# Patient Record
Sex: FEMALE | Race: OTHER | Employment: FULL TIME | URBAN - METROPOLITAN AREA
[De-identification: names, ages, dates, MRNs, and addresses within clinical notes are randomized per-mention and may not be internally consistent; named-entity substitution may affect disease eponyms.]

---

## 2017-02-13 ENCOUNTER — OFFICE VISIT (OUTPATIENT)
Dept: OTOLARYNGOLOGY | Facility: CLINIC | Age: 32
End: 2017-02-13

## 2017-02-13 ENCOUNTER — APPOINTMENT (OUTPATIENT)
Dept: LAB | Facility: HOSPITAL | Age: 32
End: 2017-02-13
Attending: OTOLARYNGOLOGY
Payer: COMMERCIAL

## 2017-02-13 VITALS
SYSTOLIC BLOOD PRESSURE: 108 MMHG | BODY MASS INDEX: 20.09 KG/M2 | WEIGHT: 125 LBS | DIASTOLIC BLOOD PRESSURE: 74 MMHG | HEART RATE: 76 BPM | TEMPERATURE: 99 F | HEIGHT: 66 IN

## 2017-02-13 DIAGNOSIS — Z91.09 ENVIRONMENTAL ALLERGIES: ICD-10-CM

## 2017-02-13 DIAGNOSIS — E04.1 THYROID NODULE: Primary | ICD-10-CM

## 2017-02-13 DIAGNOSIS — E04.1 THYROID NODULE: ICD-10-CM

## 2017-02-13 LAB — TSH SERPL-ACNC: 12.1 UIU/ML (ref 0.34–5.6)

## 2017-02-13 PROCEDURE — 99212 OFFICE O/P EST SF 10 MIN: CPT | Performed by: OTOLARYNGOLOGY

## 2017-02-13 PROCEDURE — 84443 ASSAY THYROID STIM HORMONE: CPT

## 2017-02-13 PROCEDURE — 36415 COLL VENOUS BLD VENIPUNCTURE: CPT

## 2017-02-13 PROCEDURE — 99213 OFFICE O/P EST LOW 20 MIN: CPT | Performed by: OTOLARYNGOLOGY

## 2017-02-13 RX ORDER — MONTELUKAST SODIUM 10 MG/1
10 TABLET ORAL NIGHTLY
Qty: 30 TABLET | Refills: 11 | Status: SHIPPED | OUTPATIENT
Start: 2017-02-13 | End: 2019-06-17

## 2017-02-13 NOTE — PROGRESS NOTES
Hayden Bourgeois is a 33year old female. Patient presents with: Follow - Up: thyroid,due for labs      HISTORY OF PRESENT ILLNESS    4/25/16 Patient presents for evaluation of a thyroid nodule. This was noticed 3years ago by the patient.  This has rolle Family History   Problem Relation Age of Onset   • Other[other] [OTHER] Mother        Past Medical History   Diagnosis Date   • Cancer [facility]) 2001     Osteosarcoma - left tibia   • Allergic rhinitis            Past Surgical History    BACK SONNY External nose - Normal. Lips/teeth/gums - Normal. Tonsils - Normal. Oropharynx - Normal.   Nose/Mouth/Throat Normal Nares - Right: Normal Left: Normal. Septum -Normal  Turbinates - Right: Normal, Left: Normal.          Current outpatient prescriptions:   •

## 2017-02-27 ENCOUNTER — PATIENT MESSAGE (OUTPATIENT)
Dept: OTOLARYNGOLOGY | Facility: CLINIC | Age: 32
End: 2017-02-27

## 2017-02-28 ENCOUNTER — TELEPHONE (OUTPATIENT)
Dept: OTOLARYNGOLOGY | Facility: CLINIC | Age: 32
End: 2017-02-28

## 2017-03-07 ENCOUNTER — OFFICE VISIT (OUTPATIENT)
Dept: ENDOCRINOLOGY CLINIC | Facility: CLINIC | Age: 32
End: 2017-03-07

## 2017-03-07 ENCOUNTER — APPOINTMENT (OUTPATIENT)
Dept: LAB | Facility: HOSPITAL | Age: 32
End: 2017-03-07
Attending: INTERNAL MEDICINE
Payer: COMMERCIAL

## 2017-03-07 VITALS
HEIGHT: 66 IN | BODY MASS INDEX: 20.73 KG/M2 | SYSTOLIC BLOOD PRESSURE: 122 MMHG | DIASTOLIC BLOOD PRESSURE: 77 MMHG | TEMPERATURE: 98 F | RESPIRATION RATE: 18 BRPM | HEART RATE: 55 BPM | WEIGHT: 129 LBS

## 2017-03-07 DIAGNOSIS — E03.9 HYPOTHYROIDISM, UNSPECIFIED TYPE: ICD-10-CM

## 2017-03-07 DIAGNOSIS — E03.9 HYPOTHYROIDISM, UNSPECIFIED TYPE: Primary | ICD-10-CM

## 2017-03-07 LAB
T4 FREE SERPL-MCNC: 0.53 NG/DL (ref 0.58–1.64)
TSH SERPL-ACNC: 14.71 UIU/ML (ref 0.34–5.6)

## 2017-03-07 PROCEDURE — 99213 OFFICE O/P EST LOW 20 MIN: CPT | Performed by: INTERNAL MEDICINE

## 2017-03-07 PROCEDURE — 84439 ASSAY OF FREE THYROXINE: CPT

## 2017-03-07 PROCEDURE — 99212 OFFICE O/P EST SF 10 MIN: CPT | Performed by: INTERNAL MEDICINE

## 2017-03-07 PROCEDURE — 84443 ASSAY THYROID STIM HORMONE: CPT

## 2017-03-07 PROCEDURE — 36415 COLL VENOUS BLD VENIPUNCTURE: CPT

## 2017-03-07 NOTE — PROGRESS NOTES
Return Office Visit     CHIEF COMPLAINT:    Post operative hypothyroidism     HISTORY OF PRESENT ILLNESS:  Rosangela Moseley is a 33year old female who presents for follow of post operative hypothyroidism.      She underwent a right hemithyroidectomy for c normal affect  EYES:  No proptosis, no ptosis, conjunctiva normal  ENT:  Normocephalic, atraumatic  NECK:  Supple, symmetrical, trachea midline, no adenopathy, Left thyroid normal in size, non tender.   HEMATOLOGIC/LYMPHATICS:  no cervical lymphadenopathy a

## 2017-03-08 DIAGNOSIS — E89.0 POSTOPERATIVE HYPOTHYROIDISM: Primary | ICD-10-CM

## 2017-03-08 NOTE — TELEPHONE ENCOUNTER
Please let the patient know that I reviewed her thyroid labs   I recommend starting 88 mcg daily. She should repeat labs ( ordered TSH and FT 4) in 6 weeks after starting medication. Ordered.   Please finalize medication after checking 2 month vs 3 month

## 2017-03-09 NOTE — TELEPHONE ENCOUNTER
From: Oralia Mejia  To: Kimberly Evans MD  Sent: 2/27/2017 11:14 AM CST  Subject: Prescription Question    Due to my test results, should I get a prescription? What is the next step? Can I get an answer today?

## 2017-03-10 RX ORDER — LEVOTHYROXINE SODIUM 88 UG/1
88 TABLET ORAL DAILY
Qty: 90 TABLET | Refills: 0 | Status: SHIPPED | OUTPATIENT
Start: 2017-03-10 | End: 2017-05-31 | Stop reason: DRUGHIGH

## 2017-03-10 NOTE — TELEPHONE ENCOUNTER
Lili returned call this morning. Discussed results and she understands to start LT4 88mcg PO daily. Discussed importance of taking on an empty stomach at least 1 hr before food and  any vitamins by 4 hours.  She will repeat labs in 6 weeks afte

## 2017-05-22 ENCOUNTER — PATIENT MESSAGE (OUTPATIENT)
Dept: ENDOCRINOLOGY CLINIC | Facility: CLINIC | Age: 32
End: 2017-05-22

## 2017-05-22 NOTE — TELEPHONE ENCOUNTER
From: Jl Whiteside  To: John Devi MD  Sent: 5/22/2017 2:10 PM CDT  Subject: Other    I received an email today about an analysis that I need to get done. I am not sure which analysis that letter refer to.  I will be done with the Levothyroxine

## 2017-05-22 NOTE — TELEPHONE ENCOUNTER
[facility] Sent [facility] an [facility]. She was due for thyroid labs on 4/19/17.

## 2017-05-30 ENCOUNTER — APPOINTMENT (OUTPATIENT)
Dept: LAB | Age: 32
End: 2017-05-30
Attending: INTERNAL MEDICINE
Payer: COMMERCIAL

## 2017-05-30 DIAGNOSIS — E89.0 POSTOPERATIVE HYPOTHYROIDISM: ICD-10-CM

## 2017-05-30 PROCEDURE — 84443 ASSAY THYROID STIM HORMONE: CPT

## 2017-05-30 PROCEDURE — 36415 COLL VENOUS BLD VENIPUNCTURE: CPT

## 2017-05-30 PROCEDURE — 84439 ASSAY OF FREE THYROXINE: CPT

## 2017-05-31 ENCOUNTER — TELEPHONE (OUTPATIENT)
Dept: ENDOCRINOLOGY CLINIC | Facility: CLINIC | Age: 32
End: 2017-05-31

## 2017-05-31 DIAGNOSIS — E03.9 HYPOTHYROIDISM, UNSPECIFIED TYPE: Primary | ICD-10-CM

## 2017-05-31 RX ORDER — LEVOTHYROXINE SODIUM 0.07 MG/1
75 TABLET ORAL DAILY
Qty: 90 TABLET | Refills: 0 | Status: SHIPPED | OUTPATIENT
Start: 2017-05-31 | End: 2017-08-15

## 2017-05-31 NOTE — TELEPHONE ENCOUNTER
She is on LT 4 88 mcg daily. Labs indicate need for dose reduction. Decrease to 75 mcg daily  Repeat TSH and FT4 in 8 weeks after dose change. Medication and labs ordered.

## 2017-05-31 NOTE — TELEPHONE ENCOUNTER
Spoke with Lili and let her know per AM she needs dose decrease. Understands to decrease to 75mcg PO daily. She will repeat labs in 8 weeks after dose change.

## 2017-07-24 ENCOUNTER — APPOINTMENT (OUTPATIENT)
Dept: LAB | Age: 32
End: 2017-07-24
Attending: INTERNAL MEDICINE
Payer: COMMERCIAL

## 2017-07-24 DIAGNOSIS — E03.9 HYPOTHYROIDISM, UNSPECIFIED TYPE: ICD-10-CM

## 2017-07-24 LAB
T4 FREE SERPL-MCNC: 1.01 NG/DL (ref 0.58–1.64)
TSH SERPL-ACNC: 2.14 UIU/ML (ref 0.45–5.33)

## 2017-07-24 PROCEDURE — 84443 ASSAY THYROID STIM HORMONE: CPT

## 2017-07-24 PROCEDURE — 36415 COLL VENOUS BLD VENIPUNCTURE: CPT

## 2017-07-24 PROCEDURE — 84439 ASSAY OF FREE THYROXINE: CPT

## 2017-08-15 RX ORDER — LEVOTHYROXINE SODIUM 0.07 MG/1
75 TABLET ORAL DAILY
Qty: 90 TABLET | Refills: 0 | Status: SHIPPED
Start: 2017-08-15 | End: 2017-12-04

## 2017-08-15 NOTE — TELEPHONE ENCOUNTER
From: Lamin Tobin  Sent: 8/15/2017 12:57 PM CDT  Subject: Medication Renewal Request    Lamin Tobin would like a refill of the following medications:  Levothyroxine Sodium 75 MCG Oral Tab Keagan Montanez MD]    [facility]

## 2017-11-06 RX ORDER — LEVOTHYROXINE SODIUM 0.07 MG/1
TABLET ORAL
Qty: 30 TABLET | Refills: 0 | OUTPATIENT
Start: 2017-11-06

## 2017-11-06 NOTE — TELEPHONE ENCOUNTER
LOV 3/7/17 with RTC 6 months. No F/U scheduled Called patient with a . Left detailed message that she is due for an appt. 1 month refill pending.

## 2017-11-16 RX ORDER — LEVOTHYROXINE SODIUM 0.07 MG/1
TABLET ORAL
Qty: 30 TABLET | Refills: 0 | OUTPATIENT
Start: 2017-11-16

## 2017-11-21 RX ORDER — LEVOTHYROXINE SODIUM 0.07 MG/1
75 TABLET ORAL
Qty: 30 TABLET | Refills: 0 | Status: CANCELLED | OUTPATIENT
Start: 2017-11-21

## 2017-11-21 NOTE — TELEPHONE ENCOUNTER
LOV 3/7/17. She can schedule an appointment with you on EARTHNET by clicking on the \"make an appointment\" option. Refill pending. Her referral for endocrine is still active for one more visit. She will need a new referral after that.

## 2017-12-04 ENCOUNTER — OFFICE VISIT (OUTPATIENT)
Dept: FAMILY MEDICINE CLINIC | Facility: CLINIC | Age: 32
End: 2017-12-04

## 2017-12-04 ENCOUNTER — LAB ENCOUNTER (OUTPATIENT)
Dept: LAB | Age: 32
End: 2017-12-04
Attending: FAMILY MEDICINE
Payer: COMMERCIAL

## 2017-12-04 VITALS
TEMPERATURE: 98 F | HEART RATE: 55 BPM | BODY MASS INDEX: 21.56 KG/M2 | DIASTOLIC BLOOD PRESSURE: 82 MMHG | HEIGHT: 65.5 IN | WEIGHT: 131 LBS | SYSTOLIC BLOOD PRESSURE: 135 MMHG

## 2017-12-04 DIAGNOSIS — E89.0 POSTOPERATIVE HYPOTHYROIDISM: ICD-10-CM

## 2017-12-04 DIAGNOSIS — Z00.00 ROUTINE GENERAL MEDICAL EXAMINATION AT A HEALTH CARE FACILITY: ICD-10-CM

## 2017-12-04 DIAGNOSIS — Z00.00 ROUTINE GENERAL MEDICAL EXAMINATION AT A HEALTH CARE FACILITY: Primary | ICD-10-CM

## 2017-12-04 PROCEDURE — 80061 LIPID PANEL: CPT

## 2017-12-04 PROCEDURE — 84439 ASSAY OF FREE THYROXINE: CPT

## 2017-12-04 PROCEDURE — 80053 COMPREHEN METABOLIC PANEL: CPT

## 2017-12-04 PROCEDURE — 99395 PREV VISIT EST AGE 18-39: CPT | Performed by: FAMILY MEDICINE

## 2017-12-04 PROCEDURE — 84443 ASSAY THYROID STIM HORMONE: CPT

## 2017-12-04 PROCEDURE — 85025 COMPLETE CBC W/AUTO DIFF WBC: CPT

## 2017-12-04 PROCEDURE — 36415 COLL VENOUS BLD VENIPUNCTURE: CPT

## 2017-12-04 NOTE — PROGRESS NOTES
HPI:    Patient ID: Neil Rosario is a 27year old female.     HPI  Patient presents with:  Routine Physical  Thyroid Problem: pt would like to refill on Levothyroxine    Past Medical History:   Diagnosis Date   • Allergic rhinitis    [facility]   Pulmonary/Chest: Effort normal and breath sounds normal.   Abdominal: There is no splenomegaly or hepatomegaly. There is no tenderness. There is no CVA tenderness. Musculoskeletal:        Left knee: She exhibits decreased range of motion.

## 2017-12-04 NOTE — TELEPHONE ENCOUNTER
From: Kimberly Meza  Sent: 12/4/2017 3:23 PM CST  Subject: Medication Renewal Request    Lili Chou would like a refill of the following medications:     Levothyroxine Sodium 75 MCG Oral Tab Marian Ayala MD]    Preferred pharmacy: [facility] [address] [place] [address], [phone], [phone]

## 2017-12-04 NOTE — TELEPHONE ENCOUNTER
LOV 3/2017. RTC in 6 months. Replied to patients request through mychart informing her she is due for follow up.

## 2017-12-11 RX ORDER — LEVOTHYROXINE SODIUM 0.07 MG/1
75 TABLET ORAL DAILY
Qty: 30 TABLET | Refills: 0 | Status: SHIPPED
Start: 2017-12-11 | End: 2017-12-12

## 2017-12-12 ENCOUNTER — OFFICE VISIT (OUTPATIENT)
Dept: ENDOCRINOLOGY CLINIC | Facility: CLINIC | Age: 32
End: 2017-12-12

## 2017-12-12 VITALS
HEIGHT: 66 IN | DIASTOLIC BLOOD PRESSURE: 78 MMHG | BODY MASS INDEX: 21.16 KG/M2 | SYSTOLIC BLOOD PRESSURE: 130 MMHG | WEIGHT: 131.63 LBS | HEART RATE: 57 BPM

## 2017-12-12 DIAGNOSIS — E03.9 HYPOTHYROIDISM, UNSPECIFIED TYPE: Primary | ICD-10-CM

## 2017-12-12 PROBLEM — E03.8 OTHER SPECIFIED HYPOTHYROIDISM: Status: ACTIVE | Noted: 2017-12-12

## 2017-12-12 PROCEDURE — 99212 OFFICE O/P EST SF 10 MIN: CPT | Performed by: INTERNAL MEDICINE

## 2017-12-12 PROCEDURE — 99213 OFFICE O/P EST LOW 20 MIN: CPT | Performed by: INTERNAL MEDICINE

## 2017-12-12 RX ORDER — LEVOTHYROXINE SODIUM 88 UG/1
88 TABLET ORAL
Qty: 90 TABLET | Refills: 0 | Status: SHIPPED | OUTPATIENT
Start: 2017-12-12 | End: 2018-05-25

## 2017-12-12 RX ORDER — ERGOCALCIFEROL (VITAMIN D2) 10 MCG
TABLET ORAL
COMMUNITY
End: 2018-11-14

## 2017-12-12 NOTE — PROGRESS NOTES
Return Office Visit     CHIEF COMPLAINT:    Post operative hypothyroidism     HISTORY OF PRESENT ILLNESS:  Salina Del Valle is a 27year old female who presents for follow of post operative hypothyroidism.      She underwent a right hemithyroidectomy for c for: rash, blister, cellulitis,       PHYSICAL EXAM:    12/12/17  1742 12/12/17  1821   BP: 152/94 130/78   Pulse: 57    Weight: 131 lb 9.6 oz (59.7 kg)    Height: 5' 6\" (1.676 m)      BMI: Body mass index is 21.24 kg/m².      CONSTITUTIONAL:  awake, alert noncompliance including the development of myxedema coma and death discussed  Patient knows that she should not get pregnant before TSH is < 2.5. Patient verbalized understanding of above instructions. RTC in 6 months.       Orders Placed This Encoun

## 2018-04-05 ENCOUNTER — PATIENT MESSAGE (OUTPATIENT)
Dept: ENDOCRINOLOGY CLINIC | Facility: CLINIC | Age: 33
End: 2018-04-05

## 2018-04-05 NOTE — TELEPHONE ENCOUNTER
LOV 12/12/17 and patient was instructed to RTC in 6 months. Would you like to see her sooner? Otherwise can let her know that there are thyroid lab orders pending in her chart.

## 2018-04-05 NOTE — TELEPHONE ENCOUNTER
From: Ann Nassar  To: Sia Garvin MD  Sent: 4/5/2018 1:03 PM CDT  Subject: Visit Verl Opitz Dr Rama Standard,    I just wanted to confirm that the blood analysis request is already in the system.  I wanted to make sure I can go and

## 2018-05-25 RX ORDER — LEVOTHYROXINE SODIUM 88 UG/1
88 TABLET ORAL
Qty: 90 TABLET | Refills: 0 | Status: SHIPPED | OUTPATIENT
Start: 2018-05-25 | End: 2018-08-20

## 2018-05-31 ENCOUNTER — OFFICE VISIT (OUTPATIENT)
Dept: FAMILY MEDICINE CLINIC | Facility: CLINIC | Age: 33
End: 2018-05-31

## 2018-05-31 VITALS
WEIGHT: 132.5 LBS | HEART RATE: 62 BPM | BODY MASS INDEX: 21 KG/M2 | DIASTOLIC BLOOD PRESSURE: 87 MMHG | SYSTOLIC BLOOD PRESSURE: 124 MMHG

## 2018-05-31 DIAGNOSIS — Z85.830 HISTORY OF OSTEOSARCOMA: ICD-10-CM

## 2018-05-31 DIAGNOSIS — R82.90 BAD ODOR OF URINE: Primary | ICD-10-CM

## 2018-05-31 DIAGNOSIS — R31.9 HEMATURIA, UNSPECIFIED TYPE: ICD-10-CM

## 2018-05-31 DIAGNOSIS — M62.562 ATROPHY OF MUSCLE OF LEFT LOWER LEG: ICD-10-CM

## 2018-05-31 PROCEDURE — 81002 URINALYSIS NONAUTO W/O SCOPE: CPT | Performed by: FAMILY MEDICINE

## 2018-05-31 PROCEDURE — 99212 OFFICE O/P EST SF 10 MIN: CPT | Performed by: FAMILY MEDICINE

## 2018-05-31 PROCEDURE — 99214 OFFICE O/P EST MOD 30 MIN: CPT | Performed by: FAMILY MEDICINE

## 2018-06-02 RX ORDER — CIPROFLOXACIN 500 MG/1
500 TABLET, FILM COATED ORAL 2 TIMES DAILY
Qty: 6 TABLET | Refills: 0 | Status: SHIPPED | OUTPATIENT
Start: 2018-06-02 | End: 2018-11-14

## 2018-08-20 ENCOUNTER — TELEPHONE (OUTPATIENT)
Dept: ENDOCRINOLOGY CLINIC | Facility: CLINIC | Age: 33
End: 2018-08-20

## 2018-08-20 RX ORDER — LEVOTHYROXINE SODIUM 88 UG/1
TABLET ORAL
Qty: 90 TABLET | Refills: 0 | Status: SHIPPED | OUTPATIENT
Start: 2018-08-20 | End: 2018-12-16

## 2018-08-20 NOTE — TELEPHONE ENCOUNTER
Refilled  Left VM that she is due for a follow up  Please send a letter also    Will not be able to refill after this without an apt since it will be almost a year. Thanks.

## 2018-11-06 ENCOUNTER — PATIENT MESSAGE (OUTPATIENT)
Dept: FAMILY MEDICINE CLINIC | Facility: CLINIC | Age: 33
End: 2018-11-06

## 2018-11-06 ENCOUNTER — APPOINTMENT (OUTPATIENT)
Dept: LAB | Age: 33
End: 2018-11-06
Attending: INTERNAL MEDICINE
Payer: COMMERCIAL

## 2018-11-06 ENCOUNTER — TELEPHONE (OUTPATIENT)
Dept: ENDOCRINOLOGY CLINIC | Facility: CLINIC | Age: 33
End: 2018-11-06

## 2018-11-06 DIAGNOSIS — N91.2 AMENORRHEA: Primary | ICD-10-CM

## 2018-11-06 DIAGNOSIS — E03.9 HYPOTHYROIDISM, UNSPECIFIED TYPE: ICD-10-CM

## 2018-11-06 PROCEDURE — 84439 ASSAY OF FREE THYROXINE: CPT

## 2018-11-06 PROCEDURE — 84443 ASSAY THYROID STIM HORMONE: CPT

## 2018-11-06 PROCEDURE — 36415 COLL VENOUS BLD VENIPUNCTURE: CPT

## 2018-11-06 NOTE — TELEPHONE ENCOUNTER
From: Oralia Mejia  To:  Chacha Arias DO  Sent: 11/6/2018 9:48 AM CST  Subject: Test Results Question    Can I get a prescription for a blood pregnancy test?

## 2018-11-06 NOTE — TELEPHONE ENCOUNTER
Spoke with patient. Labs are still in process. Advised patient to keep scheduled appt with AM tomorrow and she can review lab results at that time. She is agreeable.

## 2018-11-06 NOTE — TELEPHONE ENCOUNTER
Pt would like to know the results from labs taken pt is scheduled for tomorrow wanted to know if she can be seen today at 1015 am attempt to transfer to ask w no answer pt ok w that is there a way she can be seen later today rather then tomorrow

## 2018-11-07 ENCOUNTER — OFFICE VISIT (OUTPATIENT)
Dept: ENDOCRINOLOGY CLINIC | Facility: CLINIC | Age: 33
End: 2018-11-07

## 2018-11-07 ENCOUNTER — APPOINTMENT (OUTPATIENT)
Dept: LAB | Facility: HOSPITAL | Age: 33
End: 2018-11-07
Attending: FAMILY MEDICINE
Payer: COMMERCIAL

## 2018-11-07 VITALS
DIASTOLIC BLOOD PRESSURE: 82 MMHG | SYSTOLIC BLOOD PRESSURE: 138 MMHG | HEART RATE: 83 BPM | BODY MASS INDEX: 22 KG/M2 | WEIGHT: 135.38 LBS

## 2018-11-07 DIAGNOSIS — O99.281 HYPOTHYROIDISM DURING PREGNANCY IN FIRST TRIMESTER: Primary | ICD-10-CM

## 2018-11-07 DIAGNOSIS — N91.2 AMENORRHEA: ICD-10-CM

## 2018-11-07 DIAGNOSIS — E03.9 HYPOTHYROIDISM DURING PREGNANCY IN FIRST TRIMESTER: Primary | ICD-10-CM

## 2018-11-07 PROCEDURE — 84703 CHORIONIC GONADOTROPIN ASSAY: CPT

## 2018-11-07 PROCEDURE — 36415 COLL VENOUS BLD VENIPUNCTURE: CPT

## 2018-11-07 PROCEDURE — 99212 OFFICE O/P EST SF 10 MIN: CPT | Performed by: INTERNAL MEDICINE

## 2018-11-07 PROCEDURE — 99213 OFFICE O/P EST LOW 20 MIN: CPT | Performed by: INTERNAL MEDICINE

## 2018-11-07 NOTE — PROGRESS NOTES
Return Office Visit     CHIEF COMPLAINT:    Post operative hypothyroidism     HISTORY OF PRESENT ILLNESS:  Corrie Slaughter is a 26year old female who presents for follow of post operative hypothyroidism.      She underwent a right hemithyroidectomy for c Negative for: bruising, lower extremity edema  Endocrine: Negative for: polyuria, polydypsia  Skin: Negative for: rash, blister, cellulitis,       PHYSICAL EXAM:    11/07/18  0950   BP: 138/82   Pulse: 83   Weight: 135 lb 6.4 oz (61.4 kg)     BMI: Body mas can occur in pregnant women with overt hypothyroidism. Also, the offspring of these mothers can have complications such as premature birth, low birth weight and increased  respiratory distress.      She is on a MV      Patient states she has a sore

## 2018-11-09 ENCOUNTER — PATIENT MESSAGE (OUTPATIENT)
Dept: FAMILY MEDICINE CLINIC | Facility: CLINIC | Age: 33
End: 2018-11-09

## 2018-11-13 NOTE — TELEPHONE ENCOUNTER
From: Gary Garvin  To: Jax Miller DO  Sent: 11/9/2018 9:25 AM CST  Subject: Test Results Kendrick Grande,    I have not received the results for the test yet and it has been 48 hours already.  I was wondering if you can help me ge

## 2018-11-14 ENCOUNTER — APPOINTMENT (OUTPATIENT)
Dept: LAB | Facility: HOSPITAL | Age: 33
End: 2018-11-14
Attending: ADVANCED PRACTICE MIDWIFE
Payer: COMMERCIAL

## 2018-11-14 ENCOUNTER — OFFICE VISIT (OUTPATIENT)
Dept: OBGYN CLINIC | Facility: CLINIC | Age: 33
End: 2018-11-14

## 2018-11-14 VITALS
DIASTOLIC BLOOD PRESSURE: 87 MMHG | HEART RATE: 75 BPM | SYSTOLIC BLOOD PRESSURE: 132 MMHG | WEIGHT: 132 LBS | BODY MASS INDEX: 21 KG/M2

## 2018-11-14 DIAGNOSIS — O99.280 HYPOTHYROID IN PREGNANCY, ANTEPARTUM: ICD-10-CM

## 2018-11-14 DIAGNOSIS — N92.6 MISSED MENSES: Primary | ICD-10-CM

## 2018-11-14 DIAGNOSIS — Z85.830 PERSONAL HISTORY OF OSTEOSARCOMA: ICD-10-CM

## 2018-11-14 DIAGNOSIS — E03.9 HYPOTHYROID IN PREGNANCY, ANTEPARTUM: ICD-10-CM

## 2018-11-14 DIAGNOSIS — N92.6 MISSED MENSES: ICD-10-CM

## 2018-11-14 DIAGNOSIS — O09.511 PRIMIGRAVIDA OF ADVANCED MATERNAL AGE IN FIRST TRIMESTER: ICD-10-CM

## 2018-11-14 PROBLEM — O09.521 MULTIGRAVIDA OF ADVANCED MATERNAL AGE IN FIRST TRIMESTER (HCC): Status: ACTIVE | Noted: 2018-11-14

## 2018-11-14 PROBLEM — O09.519 ADVANCED MATERNAL AGE, 1ST PREGNANCY (HCC): Status: ACTIVE | Noted: 2018-11-14

## 2018-11-14 PROBLEM — O09.521 MULTIGRAVIDA OF ADVANCED MATERNAL AGE IN FIRST TRIMESTER: Status: ACTIVE | Noted: 2018-11-14

## 2018-11-14 PROBLEM — O09.519 ADVANCED MATERNAL AGE, 1ST PREGNANCY: Status: ACTIVE | Noted: 2018-11-14

## 2018-11-14 PROCEDURE — 36415 COLL VENOUS BLD VENIPUNCTURE: CPT

## 2018-11-14 PROCEDURE — 84144 ASSAY OF PROGESTERONE: CPT

## 2018-11-14 PROCEDURE — 99202 OFFICE O/P NEW SF 15 MIN: CPT | Performed by: ADVANCED PRACTICE MIDWIFE

## 2018-11-14 PROCEDURE — 84702 CHORIONIC GONADOTROPIN TEST: CPT

## 2018-11-14 PROCEDURE — 81025 URINE PREGNANCY TEST: CPT | Performed by: ADVANCED PRACTICE MIDWIFE

## 2018-11-14 NOTE — PROGRESS NOTES
HPI:    Patient ID: Maia Meyer is a 26year old female. LMP 10/10/18. Some mild cramping but no bleeding. Denies nausea and vomiting. MIld breast tenderness. JAIME 7/17 19 GA 5 weeks.     H/O Scoliosis with surgical correction with a jose cruz  H/O os Diagnosis Date   • Allergic rhinitis    • Cancer [facility]) 2001    Osteosarcoma - left tibia   • Hypothyroidism    • Scoliosis    • Scoliosis, adolescent, acquired 12/04/2017      Past Surgical History:   Procedure Laterality Date   • BACK SURGERY      escol hot, dogs, making sure food is appropriately cooked and washed to avoid food-borne illnesses. 3.  Travel discussed, avoid travel to zika zones, use of support stockings with flights longer than 3 hours, movement every 2-3 hours if driving  4.   Discussed

## 2018-11-28 ENCOUNTER — NURSE ONLY (OUTPATIENT)
Dept: OBGYN CLINIC | Facility: CLINIC | Age: 33
End: 2018-11-28

## 2018-11-28 VITALS — BODY MASS INDEX: 22.76 KG/M2 | WEIGHT: 136.63 LBS | HEIGHT: 65 IN

## 2018-11-28 DIAGNOSIS — Z34.90 PREGNANCY, UNSPECIFIED GESTATIONAL AGE: Primary | ICD-10-CM

## 2018-11-28 DIAGNOSIS — O09.521 MULTIGRAVIDA OF ADVANCED MATERNAL AGE IN FIRST TRIMESTER: ICD-10-CM

## 2018-11-28 DIAGNOSIS — Z85.830 HISTORY OF OSTEOSARCOMA: ICD-10-CM

## 2018-11-28 DIAGNOSIS — E03.8 OTHER SPECIFIED HYPOTHYROIDISM: ICD-10-CM

## 2018-11-28 PROCEDURE — 90471 IMMUNIZATION ADMIN: CPT | Performed by: ADVANCED PRACTICE MIDWIFE

## 2018-11-28 PROCEDURE — 90686 IIV4 VACC NO PRSV 0.5 ML IM: CPT | Performed by: ADVANCED PRACTICE MIDWIFE

## 2018-11-28 NOTE — PROGRESS NOTES
NOB education complete & information given to pt.  Isha Chowdary at visit. Referred by Marlyn Nava CNM. Pt hx hypothyroid w/ lobectomy, osteosarcoma 2011 & scoliosis requiring surgery. Pt traveled to [facility] 9/24-9/28/18.  Did not sustain any mosquito bi

## 2018-12-01 ENCOUNTER — HOSPITAL ENCOUNTER (OUTPATIENT)
Dept: ULTRASOUND IMAGING | Age: 33
Discharge: HOME | End: 2018-12-01
Attending: ADVANCED PRACTICE MIDWIFE
Payer: COMMERCIAL

## 2018-12-01 DIAGNOSIS — N92.6 MISSED MENSES: ICD-10-CM

## 2018-12-01 PROCEDURE — 76817 TRANSVAGINAL US OBSTETRIC: CPT | Performed by: ADVANCED PRACTICE MIDWIFE

## 2018-12-01 PROCEDURE — 76801 OB US < 14 WKS SINGLE FETUS: CPT | Performed by: ADVANCED PRACTICE MIDWIFE

## 2018-12-03 ENCOUNTER — TELEPHONE (OUTPATIENT)
Dept: OBGYN CLINIC | Facility: CLINIC | Age: 33
End: 2018-12-03

## 2018-12-03 DIAGNOSIS — O20.0 THREATENED ABORTION, ANTEPARTUM: Primary | ICD-10-CM

## 2018-12-03 NOTE — TELEPHONE ENCOUNTER
Had a large suchorionic bleed surrounding the US and is concerned about the results. Discussed results with patient. Advised to repeat US in 2 weeks and to observe for signs of miscarriage. Discussed option of pelvic rest but not required.   No evidence

## 2018-12-03 NOTE — TELEPHONE ENCOUNTER
Regarding: FW: Test Results Question  Contact: [phone]      ----- Message -----  From: Penny Matos RN  Sent: 12/3/2018   8:23 AM  To: [facility] Clinical Staff  Subject: Test Results Question                            ----- Message from Silvia Smallwood

## 2018-12-04 ENCOUNTER — TELEPHONE (OUTPATIENT)
Dept: OBGYN CLINIC | Facility: CLINIC | Age: 33
End: 2018-12-04

## 2018-12-04 NOTE — TELEPHONE ENCOUNTER
Regarding: Test Results Question  Contact: [phone]  ----- Message from Debby Sprague RN sent at 12/3/2018  8:23 AM CST -----       ----- Message from Marisa Real to FIONA Otero sent at 12/2/2018  8:27 AM -----   CARLIE Queen

## 2018-12-15 ENCOUNTER — HOSPITAL ENCOUNTER (OUTPATIENT)
Dept: ULTRASOUND IMAGING | Age: 33
Discharge: HOME | End: 2018-12-15
Attending: ADVANCED PRACTICE MIDWIFE
Payer: COMMERCIAL

## 2018-12-15 DIAGNOSIS — O20.0 THREATENED ABORTION, ANTEPARTUM: ICD-10-CM

## 2018-12-15 PROCEDURE — 76801 OB US < 14 WKS SINGLE FETUS: CPT | Performed by: ADVANCED PRACTICE MIDWIFE

## 2018-12-17 RX ORDER — LEVOTHYROXINE SODIUM 88 UG/1
TABLET ORAL
Qty: 90 TABLET | Refills: 0 | Status: SHIPPED | OUTPATIENT
Start: 2018-12-17 | End: 2019-01-03 | Stop reason: DRUGHIGH

## 2018-12-17 RX ORDER — LEVOTHYROXINE SODIUM 88 UG/1
88 TABLET ORAL
Qty: 90 TABLET | Refills: 0 | Status: SHIPPED | OUTPATIENT
Start: 2018-12-17 | End: 2018-12-26

## 2018-12-26 ENCOUNTER — INITIAL PRENATAL (OUTPATIENT)
Dept: OBGYN CLINIC | Facility: CLINIC | Age: 33
End: 2018-12-26

## 2018-12-26 VITALS
WEIGHT: 137 LBS | BODY MASS INDEX: 23 KG/M2 | HEART RATE: 72 BPM | SYSTOLIC BLOOD PRESSURE: 137 MMHG | DIASTOLIC BLOOD PRESSURE: 91 MMHG

## 2018-12-26 DIAGNOSIS — Z34.01 ENCOUNTER FOR SUPERVISION OF NORMAL FIRST PREGNANCY IN FIRST TRIMESTER: Primary | ICD-10-CM

## 2018-12-26 DIAGNOSIS — R03.0 BORDERLINE HIGH BLOOD PRESSURE: ICD-10-CM

## 2018-12-26 DIAGNOSIS — Z12.4 SCREENING FOR MALIGNANT NEOPLASM OF CERVIX: ICD-10-CM

## 2018-12-26 DIAGNOSIS — Z11.3 SCREEN FOR STD (SEXUALLY TRANSMITTED DISEASE): ICD-10-CM

## 2018-12-26 PROCEDURE — 81002 URINALYSIS NONAUTO W/O SCOPE: CPT | Performed by: ADVANCED PRACTICE MIDWIFE

## 2018-12-26 NOTE — PROGRESS NOTES
Repeat /85. Discussed with pt and  borderline BP readings. Pt to have baseline 24 hr urine and comp. Pt to RTO in 2 weeks for additional BP reading. If HTN suspected pt to transfer to MD management.   Pt has appt with endo for thyroid dysfu

## 2018-12-28 ENCOUNTER — LAB ENCOUNTER (OUTPATIENT)
Dept: LAB | Age: 33
End: 2018-12-28
Attending: ADVANCED PRACTICE MIDWIFE
Payer: COMMERCIAL

## 2018-12-28 DIAGNOSIS — O09.521 MULTIGRAVIDA OF ADVANCED MATERNAL AGE IN FIRST TRIMESTER: ICD-10-CM

## 2018-12-28 DIAGNOSIS — Z34.90 PREGNANCY, UNSPECIFIED GESTATIONAL AGE: ICD-10-CM

## 2018-12-28 DIAGNOSIS — O99.281 HYPOTHYROIDISM DURING PREGNANCY IN FIRST TRIMESTER: ICD-10-CM

## 2018-12-28 DIAGNOSIS — Z34.01 ENCOUNTER FOR SUPERVISION OF NORMAL FIRST PREGNANCY IN FIRST TRIMESTER: ICD-10-CM

## 2018-12-28 DIAGNOSIS — E03.8 OTHER SPECIFIED HYPOTHYROIDISM: ICD-10-CM

## 2018-12-28 DIAGNOSIS — Z85.830 HISTORY OF OSTEOSARCOMA: ICD-10-CM

## 2018-12-28 DIAGNOSIS — R03.0 BORDERLINE HIGH BLOOD PRESSURE: ICD-10-CM

## 2018-12-28 DIAGNOSIS — E03.9 HYPOTHYROIDISM DURING PREGNANCY IN FIRST TRIMESTER: ICD-10-CM

## 2018-12-28 PROCEDURE — 86780 TREPONEMA PALLIDUM: CPT

## 2018-12-28 PROCEDURE — 84156 ASSAY OF PROTEIN URINE: CPT

## 2018-12-28 PROCEDURE — 83021 HEMOGLOBIN CHROMOTOGRAPHY: CPT

## 2018-12-28 PROCEDURE — 84443 ASSAY THYROID STIM HORMONE: CPT

## 2018-12-28 PROCEDURE — 80053 COMPREHEN METABOLIC PANEL: CPT

## 2018-12-28 PROCEDURE — 83020 HEMOGLOBIN ELECTROPHORESIS: CPT

## 2018-12-28 PROCEDURE — 86803 HEPATITIS C AB TEST: CPT

## 2018-12-28 PROCEDURE — 87086 URINE CULTURE/COLONY COUNT: CPT

## 2018-12-28 PROCEDURE — 87340 HEPATITIS B SURFACE AG IA: CPT

## 2018-12-28 PROCEDURE — 86900 BLOOD TYPING SEROLOGIC ABO: CPT

## 2018-12-28 PROCEDURE — 84439 ASSAY OF FREE THYROXINE: CPT

## 2018-12-28 PROCEDURE — 85025 COMPLETE CBC W/AUTO DIFF WBC: CPT

## 2018-12-28 PROCEDURE — 86901 BLOOD TYPING SEROLOGIC RH(D): CPT

## 2018-12-28 PROCEDURE — 86762 RUBELLA ANTIBODY: CPT

## 2018-12-28 PROCEDURE — 83036 HEMOGLOBIN GLYCOSYLATED A1C: CPT

## 2018-12-28 PROCEDURE — 87389 HIV-1 AG W/HIV-1&-2 AB AG IA: CPT

## 2018-12-28 PROCEDURE — 82950 GLUCOSE TEST: CPT

## 2018-12-28 PROCEDURE — 86850 RBC ANTIBODY SCREEN: CPT

## 2018-12-28 PROCEDURE — 36415 COLL VENOUS BLD VENIPUNCTURE: CPT

## 2018-12-30 ENCOUNTER — TELEPHONE (OUTPATIENT)
Dept: ENDOCRINOLOGY CLINIC | Facility: CLINIC | Age: 33
End: 2018-12-30

## 2018-12-30 DIAGNOSIS — E03.9 HYPOTHYROIDISM, UNSPECIFIED TYPE: Primary | ICD-10-CM

## 2018-12-31 NOTE — TELEPHONE ENCOUNTER
Patient is pregnant. She is on LT 4 88 mcg daily  TSH is 2.75, in trimester one. It is recommended to keep it under 2.5. Hence, recommend dose increase: LT 4 100 mcg daily  TSH and Free T4 before apt on 2/1  Thanks.

## 2019-01-02 LAB
HGB A2 MFR BLD: 2.7 % (ref 1.5–3.5)
HGB PNL BLD ELPH: 97.3 % (ref 95.5–100)

## 2019-01-03 RX ORDER — LEVOTHYROXINE SODIUM 0.1 MG/1
100 TABLET ORAL
Qty: 30 TABLET | Refills: 3 | Status: SHIPPED | OUTPATIENT
Start: 2019-01-03 | End: 2019-05-14

## 2019-01-03 NOTE — TELEPHONE ENCOUNTER
Spoke with patient and advised of below results.  Sent prescription for increased dose of 100mcg and lab orders placed to be completed before upcoming appt on 2/1

## 2019-01-07 ENCOUNTER — TELEPHONE (OUTPATIENT)
Dept: OBGYN CLINIC | Facility: CLINIC | Age: 34
End: 2019-01-07

## 2019-01-07 NOTE — TELEPHONE ENCOUNTER
----- Message from Jennifer Yates CNM sent at 1/7/2019 10:19 AM CST -----  Please share with patient-- her labs were normal but her potassium and sodium were a little bit low so she should make sure she is getting enough electrolytes during the day.

## 2019-01-08 ENCOUNTER — ROUTINE PRENATAL (OUTPATIENT)
Dept: OBGYN CLINIC | Facility: CLINIC | Age: 34
End: 2019-01-08

## 2019-01-08 VITALS
HEART RATE: 73 BPM | SYSTOLIC BLOOD PRESSURE: 151 MMHG | DIASTOLIC BLOOD PRESSURE: 90 MMHG | BODY MASS INDEX: 23 KG/M2 | WEIGHT: 139.19 LBS

## 2019-01-08 DIAGNOSIS — Z34.01 ENCOUNTER FOR SUPERVISION OF NORMAL FIRST PREGNANCY IN FIRST TRIMESTER: Primary | ICD-10-CM

## 2019-01-08 DIAGNOSIS — O10.019 BENIGN ESSENTIAL HYPERTENSION, ANTEPARTUM: ICD-10-CM

## 2019-01-08 LAB
MULTISTIX LOT#: NORMAL NUMERIC
PH, URINE: 5 (ref 4.5–8)
SPECIFIC GRAVITY: 1.03 (ref 1–1.03)
URINE-COLOR: YELLOW
UROBILINOGEN,SEMI-QN: 0.2 MG/DL (ref 0–1.9)

## 2019-01-08 PROCEDURE — 81002 URINALYSIS NONAUTO W/O SCOPE: CPT | Performed by: ADVANCED PRACTICE MIDWIFE

## 2019-01-08 NOTE — PROGRESS NOTES
Outpatient Maternal-Fetal Medicine Consultation    Dear Ms. Sousa,    Thank you for requesting ultrasound evaluation and maternal fetal medicine consultation on your patient Rama Eisenmenger.   As you are aware she is a 26year old female with a Singleto . She indicated that her paternal grandmother is . She indicated that her paternal grandfather is . She indicated that the status of her maternal uncle is unknown.       Medications:   Current Outpatient Medications:   •  Levothyroxi third trimester ultrasound assessment for fetal growth (at 32 weeks). In addition, weekly NST's (initiating at 36 weeks gestation for women 35-39 years and at 28 weeks gestation for women 40 years and older) are also advised.  Routine obstetric care is more increases her risk of having a  delivery, only 14 additional cesareans would need to be performed to avert one unexplained fetal death.   Hence, weekly NST's are advised for women of advanced maternal age; testing should be initiated at 42 weeks for However, both of these tests are associated with lower detection and higher false positive rates.     Hypothyroidism has been associated with an increased risk of several complications, including:  ·Preeclampsia and gestational hypertension  ·Placental abru See above documentation about her history of office hypertension. We discussed the safety of low-dose aspirin and the potential benefit for prevention of gestational hypertension or preeclampsia.   Her underlying risk factors for preeclampsia would be fi

## 2019-01-10 ENCOUNTER — HOSPITAL ENCOUNTER (OUTPATIENT)
Dept: PERINATAL CARE | Facility: HOSPITAL | Age: 34
Discharge: HOME | End: 2019-01-10
Attending: ADVANCED PRACTICE MIDWIFE
Payer: COMMERCIAL

## 2019-01-10 ENCOUNTER — HOSPITAL ENCOUNTER (OUTPATIENT)
Dept: PERINATAL CARE | Facility: HOSPITAL | Age: 34
Discharge: HOME | End: 2019-01-10
Attending: OBSTETRICS & GYNECOLOGY
Payer: COMMERCIAL

## 2019-01-10 VITALS
HEIGHT: 65 IN | BODY MASS INDEX: 22.82 KG/M2 | SYSTOLIC BLOOD PRESSURE: 130 MMHG | WEIGHT: 137 LBS | DIASTOLIC BLOOD PRESSURE: 90 MMHG | HEART RATE: 74 BPM

## 2019-01-10 DIAGNOSIS — O09.521 MULTIGRAVIDA OF ADVANCED MATERNAL AGE IN FIRST TRIMESTER: ICD-10-CM

## 2019-01-10 DIAGNOSIS — Z36.9 FIRST TRIMESTER SCREENING: Primary | ICD-10-CM

## 2019-01-10 DIAGNOSIS — R03.0 BORDERLINE HIGH BLOOD PRESSURE: ICD-10-CM

## 2019-01-10 DIAGNOSIS — Z85.830 PERSONAL HISTORY OF OSTEOSARCOMA: ICD-10-CM

## 2019-01-10 DIAGNOSIS — O09.511 AMA (ADVANCED MATERNAL AGE) PRIMIGRAVIDA 35+, FIRST TRIMESTER: ICD-10-CM

## 2019-01-10 DIAGNOSIS — O99.280 HYPOTHYROID IN PREGNANCY, ANTEPARTUM: ICD-10-CM

## 2019-01-10 DIAGNOSIS — Z36.9 FIRST TRIMESTER SCREENING: ICD-10-CM

## 2019-01-10 DIAGNOSIS — E03.9 HYPOTHYROID IN PREGNANCY, ANTEPARTUM: ICD-10-CM

## 2019-01-10 PROCEDURE — 76813 OB US NUCHAL MEAS 1 GEST: CPT | Performed by: OBSTETRICS & GYNECOLOGY

## 2019-01-10 PROCEDURE — 99244 OFF/OP CNSLTJ NEW/EST MOD 40: CPT | Performed by: OBSTETRICS & GYNECOLOGY

## 2019-01-15 ENCOUNTER — TELEPHONE (OUTPATIENT)
Dept: PERINATAL CARE | Facility: HOSPITAL | Age: 34
End: 2019-01-15

## 2019-01-15 ENCOUNTER — TELEPHONE (OUTPATIENT)
Dept: OBGYN CLINIC | Facility: CLINIC | Age: 34
End: 2019-01-15

## 2019-01-15 ENCOUNTER — OFFICE VISIT (OUTPATIENT)
Dept: OBGYN CLINIC | Facility: CLINIC | Age: 34
End: 2019-01-15

## 2019-01-15 VITALS
BODY MASS INDEX: 23 KG/M2 | HEART RATE: 80 BPM | SYSTOLIC BLOOD PRESSURE: 139 MMHG | WEIGHT: 139 LBS | DIASTOLIC BLOOD PRESSURE: 86 MMHG

## 2019-01-15 DIAGNOSIS — Z92.89 HISTORY OF BLOOD TRANSFUSION: ICD-10-CM

## 2019-01-15 DIAGNOSIS — Z85.830 PERSONAL HISTORY OF OSTEOSARCOMA: ICD-10-CM

## 2019-01-15 DIAGNOSIS — I10 HYPERTENSION, UNSPECIFIED TYPE: Primary | ICD-10-CM

## 2019-01-15 DIAGNOSIS — Z87.39 PERSONAL HISTORY OF SCOLIOSIS: ICD-10-CM

## 2019-01-15 PROCEDURE — 99213 OFFICE O/P EST LOW 20 MIN: CPT | Performed by: OBSTETRICS & GYNECOLOGY

## 2019-01-15 NOTE — TELEPHONE ENCOUNTER
HARMONY screening results obt  Reviewed by MD roger  Low risk for  Trisomy 21  1:[id number] risk  Low risk for Trisomy 18/13  1:46222 risk    Fetal sex:not chosen  Pt states understanding  Copy of results sent for scanning into pt record

## 2019-01-16 ENCOUNTER — TELEPHONE (OUTPATIENT)
Dept: OBGYN CLINIC | Facility: CLINIC | Age: 34
End: 2019-01-16

## 2019-01-31 ENCOUNTER — TELEPHONE (OUTPATIENT)
Dept: ENDOCRINOLOGY CLINIC | Facility: CLINIC | Age: 34
End: 2019-01-31

## 2019-01-31 ENCOUNTER — APPOINTMENT (OUTPATIENT)
Dept: LAB | Age: 34
End: 2019-01-31
Attending: INTERNAL MEDICINE
Payer: COMMERCIAL

## 2019-01-31 DIAGNOSIS — E03.9 HYPOTHYROIDISM, UNSPECIFIED TYPE: ICD-10-CM

## 2019-01-31 LAB
T4 FREE SERPL-MCNC: 0.86 NG/DL (ref 0.58–1.64)
TSH SERPL-ACNC: 2.68 UIU/ML (ref 0.45–5.33)

## 2019-01-31 PROCEDURE — 84443 ASSAY THYROID STIM HORMONE: CPT

## 2019-01-31 PROCEDURE — 84439 ASSAY OF FREE THYROXINE: CPT

## 2019-01-31 PROCEDURE — 36415 COLL VENOUS BLD VENIPUNCTURE: CPT

## 2019-01-31 NOTE — TELEPHONE ENCOUNTER
Patient has apt tmrw. I am not working tmrw. Please reschedule to next week. She is pregnant, hence can overbook anytime at her convenience ( except Monday morning).    Thanks

## 2019-02-02 ENCOUNTER — ROUTINE PRENATAL (OUTPATIENT)
Dept: OBGYN CLINIC | Facility: CLINIC | Age: 34
End: 2019-02-02

## 2019-02-02 VITALS — WEIGHT: 140 LBS | SYSTOLIC BLOOD PRESSURE: 125 MMHG | BODY MASS INDEX: 23 KG/M2 | DIASTOLIC BLOOD PRESSURE: 83 MMHG

## 2019-02-02 DIAGNOSIS — Z34.92 NORMAL PREGNANCY IN SECOND TRIMESTER: Primary | ICD-10-CM

## 2019-02-02 LAB
APPEARANCE: CLEAR
MULTISTIX LOT#: NORMAL NUMERIC
PH, URINE: 6 (ref 4.5–8)
SPECIFIC GRAVITY: 1.02 (ref 1–1.03)
URINE-COLOR: YELLOW
UROBILINOGEN,SEMI-QN: 0.2 MG/DL (ref 0–1.9)

## 2019-02-02 PROCEDURE — 81002 URINALYSIS NONAUTO W/O SCOPE: CPT | Performed by: OBSTETRICS & GYNECOLOGY

## 2019-02-02 NOTE — PROGRESS NOTES
[facility], [facility]  Obstetrics and Gynecology  Prenatal Visit  Oscar Grande MD    [facility]   Elin Kirk Dayna Steele is a 26year old. o.  16w3d weeks. Patient thinks she is beginning to feel fetal movement.   She denies any spotting, bleeding, rupture membrane AM  2/2/2019

## 2019-02-08 ENCOUNTER — TELEPHONE (OUTPATIENT)
Dept: FAMILY MEDICINE CLINIC | Facility: CLINIC | Age: 34
End: 2019-02-08

## 2019-02-08 ENCOUNTER — OFFICE VISIT (OUTPATIENT)
Dept: ENDOCRINOLOGY CLINIC | Facility: CLINIC | Age: 34
End: 2019-02-08

## 2019-02-08 VITALS
WEIGHT: 141 LBS | DIASTOLIC BLOOD PRESSURE: 81 MMHG | HEART RATE: 64 BPM | BODY MASS INDEX: 23 KG/M2 | SYSTOLIC BLOOD PRESSURE: 143 MMHG

## 2019-02-08 DIAGNOSIS — E89.0 HYPOTHYROIDISM, POSTOP: Primary | ICD-10-CM

## 2019-02-08 DIAGNOSIS — E03.9 HYPOTHYROIDISM DURING PREGNANCY, ANTEPARTUM: Primary | ICD-10-CM

## 2019-02-08 DIAGNOSIS — O99.280 HYPOTHYROIDISM DURING PREGNANCY, ANTEPARTUM: Primary | ICD-10-CM

## 2019-02-08 PROCEDURE — 99213 OFFICE O/P EST LOW 20 MIN: CPT | Performed by: INTERNAL MEDICINE

## 2019-02-08 PROCEDURE — 99212 OFFICE O/P EST SF 10 MIN: CPT | Performed by: INTERNAL MEDICINE

## 2019-02-08 NOTE — PROGRESS NOTES
Return Office Visit     CHIEF COMPLAINT:    Post operative hypothyroidism     HISTORY OF PRESENT ILLNESS:  Rowdy Marguerite Dunn is a 26year old female who presents for follow of post operative hypothyroidism.      She underwent a right hemithyroidectomy for c frequency  Psychiatric: Negative for:  depression, anxiety  Hematology/Lymphatics: Negative for: bruising, lower extremity edema  Endocrine: Negative for: polyuria, polydypsia  Skin: Negative for: rash, blister, cellulitis,       PHYSICAL EXAM:    02/08/19 complications such as premature birth, low birth weight and increased  respiratory distress. She is on a MV          RTC in 9 weeks, labs before visit.     Orders Placed This Encounter      Assay, Thyroid Stim Hormone      Free T4, (Free Thyroxi

## 2019-02-08 NOTE — TELEPHONE ENCOUNTER
Message was routed to incorrect pool. Re-routed to correct pool for follow up. Referral pended and routed to Dr. Leonel Ware and to Dr. Jordy Silva, as Dr. Leonel Ware is out of the office on Fridays and patient was already seen.

## 2019-03-02 ENCOUNTER — ROUTINE PRENATAL (OUTPATIENT)
Dept: OBGYN CLINIC | Facility: CLINIC | Age: 34
End: 2019-03-02

## 2019-03-02 VITALS
HEART RATE: 84 BPM | DIASTOLIC BLOOD PRESSURE: 84 MMHG | SYSTOLIC BLOOD PRESSURE: 132 MMHG | BODY MASS INDEX: 24 KG/M2 | WEIGHT: 146 LBS

## 2019-03-02 DIAGNOSIS — Z34.02 ENCOUNTER FOR SUPERVISION OF NORMAL FIRST PREGNANCY IN SECOND TRIMESTER: Primary | ICD-10-CM

## 2019-03-02 PROBLEM — Z36.9 FIRST TRIMESTER SCREENING: Status: RESOLVED | Noted: 2019-01-10 | Resolved: 2019-03-02

## 2019-03-02 PROBLEM — M41.26 OTHER IDIOPATHIC SCOLIOSIS, LUMBAR REGION: Status: ACTIVE | Noted: 2019-03-02

## 2019-03-02 PROBLEM — N92.6 MISSED MENSES: Status: RESOLVED | Noted: 2018-11-14 | Resolved: 2019-03-02

## 2019-03-02 PROBLEM — Z36.9 FIRST TRIMESTER SCREENING (HCC): Status: RESOLVED | Noted: 2019-01-10 | Resolved: 2019-03-02

## 2019-03-02 LAB
MULTISTIX LOT#: NORMAL NUMERIC
PH, URINE: 7.5 (ref 4.5–8)
SPECIFIC GRAVITY: 1.01 (ref 1–1.03)
URINE-COLOR: YELLOW
UROBILINOGEN,SEMI-QN: 0.2 MG/DL (ref 0–1.9)

## 2019-03-02 PROCEDURE — 81002 URINALYSIS NONAUTO W/O SCOPE: CPT | Performed by: OBSTETRICS & GYNECOLOGY

## 2019-03-02 NOTE — PROGRESS NOTES
No c/o. Good fm. BP at home wnl when she checks it. Has level 2 u/s next week w mfm. Had low risk [place] genetic screen. Order MSAFP.

## 2019-03-06 NOTE — PROGRESS NOTES
Nii Valdovinos    Dear Dr. Kennedy Herring    Thank you for requesting ultrasound evaluation and maternal fetal medicine consultation on your patient Kai Rojas.   As you are aware she is a 26year old female  with a single genetic counseling regarding this finding is advised. Further genetic testing is generally offered if other markers are seen. ADVANCED MATERNAL AGE  See prior MFM notes for a detailed review. She did not desire invasive genetic testing.    She has a

## 2019-03-07 ENCOUNTER — HOSPITAL ENCOUNTER (OUTPATIENT)
Dept: PERINATAL CARE | Facility: HOSPITAL | Age: 34
Discharge: HOME | End: 2019-03-07
Attending: OBSTETRICS & GYNECOLOGY
Payer: COMMERCIAL

## 2019-03-07 VITALS
DIASTOLIC BLOOD PRESSURE: 83 MMHG | WEIGHT: 146 LBS | BODY MASS INDEX: 24.32 KG/M2 | HEIGHT: 65 IN | SYSTOLIC BLOOD PRESSURE: 145 MMHG | HEART RATE: 67 BPM

## 2019-03-07 DIAGNOSIS — I10 HYPERTENSION, UNSPECIFIED TYPE: ICD-10-CM

## 2019-03-07 DIAGNOSIS — O99.280 HYPOTHYROID IN PREGNANCY, ANTEPARTUM: ICD-10-CM

## 2019-03-07 DIAGNOSIS — E03.9 HYPOTHYROID IN PREGNANCY, ANTEPARTUM: ICD-10-CM

## 2019-03-07 DIAGNOSIS — R03.0 BORDERLINE HIGH BLOOD PRESSURE: ICD-10-CM

## 2019-03-07 DIAGNOSIS — O09.512 PRIMIGRAVIDA OF ADVANCED MATERNAL AGE IN SECOND TRIMESTER: ICD-10-CM

## 2019-03-07 DIAGNOSIS — Z85.830 PERSONAL HISTORY OF OSTEOSARCOMA: ICD-10-CM

## 2019-03-07 DIAGNOSIS — O09.512 PRIMIGRAVIDA OF ADVANCED MATERNAL AGE IN SECOND TRIMESTER: Primary | ICD-10-CM

## 2019-03-07 PROCEDURE — 76811 OB US DETAILED SNGL FETUS: CPT | Performed by: OBSTETRICS & GYNECOLOGY

## 2019-03-07 PROCEDURE — 99215 OFFICE O/P EST HI 40 MIN: CPT | Performed by: OBSTETRICS & GYNECOLOGY

## 2019-03-10 ENCOUNTER — HOSPITAL ENCOUNTER (OUTPATIENT)
Facility: HOSPITAL | Age: 34
Setting detail: OBSERVATION
Discharge: HOME | End: 2019-03-10
Attending: OBSTETRICS & GYNECOLOGY | Admitting: OBSTETRICS & GYNECOLOGY
Payer: COMMERCIAL

## 2019-03-10 VITALS
HEART RATE: 64 BPM | TEMPERATURE: 98 F | SYSTOLIC BLOOD PRESSURE: 122 MMHG | DIASTOLIC BLOOD PRESSURE: 84 MMHG | RESPIRATION RATE: 16 BRPM

## 2019-03-10 PROBLEM — Z34.90 PREGNANCY: Status: ACTIVE | Noted: 2019-03-10

## 2019-03-10 PROBLEM — Z34.90 PREGNANCY (HCC): Status: ACTIVE | Noted: 2019-03-10

## 2019-03-10 LAB
ALBUMIN SERPL-MCNC: 2.7 G/DL (ref 3.4–5)
ALBUMIN/GLOB SERPL: 0.7 {RATIO} (ref 1–2)
ALP LIVER SERPL-CCNC: 59 U/L (ref 37–98)
ALT SERPL-CCNC: 75 U/L (ref 13–56)
ANION GAP SERPL CALC-SCNC: 7 MMOL/L (ref 0–18)
AST SERPL-CCNC: 40 U/L (ref 15–37)
BASOPHILS # BLD AUTO: 0.04 X10(3) UL (ref 0–0.2)
BASOPHILS NFR BLD AUTO: 0.4 %
BILIRUB SERPL-MCNC: 0.5 MG/DL (ref 0.1–2)
BUN BLD-MCNC: 9 MG/DL (ref 7–18)
BUN/CREAT SERPL: 15.8 (ref 10–20)
CALCIUM BLD-MCNC: 8.2 MG/DL (ref 8.5–10.1)
CHLORIDE SERPL-SCNC: 107 MMOL/L (ref 98–107)
CO2 SERPL-SCNC: 23 MMOL/L (ref 21–32)
CREAT BLD-MCNC: 0.57 MG/DL (ref 0.55–1.02)
CREAT UR-SCNC: 56.8 MG/DL
DEPRECATED RDW RBC AUTO: 44.5 FL (ref 35.1–46.3)
EOSINOPHIL # BLD AUTO: 0.13 X10(3) UL (ref 0–0.7)
EOSINOPHIL NFR BLD AUTO: 1.3 %
ERYTHROCYTE [DISTWIDTH] IN BLOOD BY AUTOMATED COUNT: 13.2 % (ref 11–15)
GLOBULIN PLAS-MCNC: 3.7 G/DL (ref 2.8–4.4)
GLUCOSE BLD-MCNC: 81 MG/DL (ref 70–99)
HCT VFR BLD AUTO: 34.1 % (ref 35–48)
HGB BLD-MCNC: 12 G/DL (ref 12–16)
IMM GRANULOCYTES # BLD AUTO: 0.04 X10(3) UL (ref 0–1)
IMM GRANULOCYTES NFR BLD: 0.4 %
LYMPHOCYTES # BLD AUTO: 1.29 X10(3) UL (ref 1–4)
LYMPHOCYTES NFR BLD AUTO: 13 %
M PROTEIN MFR SERPL ELPH: 6.4 G/DL (ref 6.4–8.2)
MCH RBC QN AUTO: 32.7 PG (ref 26–34)
MCHC RBC AUTO-ENTMCNC: 35.2 G/DL (ref 31–37)
MCV RBC AUTO: 92.9 FL (ref 80–100)
MONOCYTES # BLD AUTO: 0.67 X10(3) UL (ref 0.1–1)
MONOCYTES NFR BLD AUTO: 6.7 %
NEUTROPHILS # BLD AUTO: 7.79 X10 (3) UL (ref 1.5–7.7)
NEUTROPHILS # BLD AUTO: 7.79 X10(3) UL (ref 1.5–7.7)
NEUTROPHILS NFR BLD AUTO: 78.2 %
OSMOLALITY SERPL CALC.SUM OF ELEC: 282 MOSM/KG (ref 275–295)
PLATELET # BLD AUTO: 254 10(3)UL (ref 150–450)
POTASSIUM SERPL-SCNC: 3.5 MMOL/L (ref 3.5–5.1)
PROT UR-MCNC: 9.9 MG/DL
PROT/CREAT UR-RTO: 0.17
RBC # BLD AUTO: 3.67 X10(6)UL (ref 3.8–5.3)
SODIUM SERPL-SCNC: 137 MMOL/L (ref 136–145)
URATE SERPL-MCNC: 2.7 MG/DL (ref 2.6–6)
WBC # BLD AUTO: 10 X10(3) UL (ref 4–11)

## 2019-03-10 NOTE — PROGRESS NOTES
Pt is a 26year old female admitted to TR1/TR1-A. Patient presents with:  Elevated BP: AT HOME 150/100  Headache: relieved by tylenol pt. denies any other symptoms of preeclampsia   Pt is  21w4d intra-uterine pregnancy.   History obtained, consents

## 2019-03-10 NOTE — TRIAGE
[facility]      Triage Note    Ferman Bernheim Patient Status:  Observation    10/20/1985 MRN [id number]   Location [address]  Attending Shannan Smith MD   [facility] Day # 0 PCP MD Naomi Louis by tylenol, and BP of 150/100 at home. FHT's 145 via doppler. Labs: CMP, uric acid, CBC, and urine protein/creatine ratio completed. Results discussed with Dr. Darshan Stack.   BP readings discussed with Dr. Darshan Stack who consulted with DENISE Almanza, and pro

## 2019-03-11 ENCOUNTER — ROUTINE PRENATAL (OUTPATIENT)
Dept: OBGYN CLINIC | Facility: CLINIC | Age: 34
End: 2019-03-11

## 2019-03-11 ENCOUNTER — APPOINTMENT (OUTPATIENT)
Dept: LAB | Facility: HOSPITAL | Age: 34
End: 2019-03-11
Attending: OBSTETRICS & GYNECOLOGY
Payer: COMMERCIAL

## 2019-03-11 ENCOUNTER — TELEPHONE (OUTPATIENT)
Dept: OBGYN CLINIC | Facility: CLINIC | Age: 34
End: 2019-03-11

## 2019-03-11 VITALS — BODY MASS INDEX: 24 KG/M2 | WEIGHT: 146 LBS | DIASTOLIC BLOOD PRESSURE: 87 MMHG | SYSTOLIC BLOOD PRESSURE: 134 MMHG

## 2019-03-11 DIAGNOSIS — R79.89 ELEVATED LFTS: Primary | ICD-10-CM

## 2019-03-11 DIAGNOSIS — Z34.92 NORMAL PREGNANCY IN SECOND TRIMESTER: ICD-10-CM

## 2019-03-11 DIAGNOSIS — R79.89 ELEVATED LFTS: ICD-10-CM

## 2019-03-11 DIAGNOSIS — I10 HYPERTENSION, UNSPECIFIED TYPE: Primary | ICD-10-CM

## 2019-03-11 LAB
ALBUMIN SERPL-MCNC: 3 G/DL (ref 3.4–5)
ALBUMIN/GLOB SERPL: 0.7 {RATIO} (ref 1–2)
ALP LIVER SERPL-CCNC: 73 U/L (ref 37–98)
ALT SERPL-CCNC: 72 U/L (ref 13–56)
ANION GAP SERPL CALC-SCNC: 5 MMOL/L (ref 0–18)
APPEARANCE: CLEAR
AST SERPL-CCNC: 29 U/L (ref 15–37)
BILIRUB SERPL-MCNC: 0.6 MG/DL (ref 0.1–2)
BUN BLD-MCNC: 11 MG/DL (ref 7–18)
BUN/CREAT SERPL: 18.3 (ref 10–20)
CALCIUM BLD-MCNC: 8.4 MG/DL (ref 8.5–10.1)
CHLORIDE SERPL-SCNC: 107 MMOL/L (ref 98–107)
CO2 SERPL-SCNC: 25 MMOL/L (ref 21–32)
CREAT BLD-MCNC: 0.6 MG/DL (ref 0.55–1.02)
GLOBULIN PLAS-MCNC: 4.4 G/DL (ref 2.8–4.4)
GLUCOSE BLD-MCNC: 78 MG/DL (ref 70–99)
M PROTEIN MFR SERPL ELPH: 7.4 G/DL (ref 6.4–8.2)
MULTISTIX LOT#: NORMAL NUMERIC
OSMOLALITY SERPL CALC.SUM OF ELEC: 282 MOSM/KG (ref 275–295)
PH, URINE: 7 (ref 4.5–8)
POTASSIUM SERPL-SCNC: 3.4 MMOL/L (ref 3.5–5.1)
SODIUM SERPL-SCNC: 137 MMOL/L (ref 136–145)
SPECIFIC GRAVITY: 1.02 (ref 1–1.03)
URINE-COLOR: YELLOW
UROBILINOGEN,SEMI-QN: 0.2 MG/DL (ref 0–1.9)

## 2019-03-11 PROCEDURE — 81002 URINALYSIS NONAUTO W/O SCOPE: CPT | Performed by: OBSTETRICS & GYNECOLOGY

## 2019-03-11 PROCEDURE — 36415 COLL VENOUS BLD VENIPUNCTURE: CPT

## 2019-03-11 PROCEDURE — 99213 OFFICE O/P EST LOW 20 MIN: CPT | Performed by: OBSTETRICS & GYNECOLOGY

## 2019-03-11 PROCEDURE — 80053 COMPREHEN METABOLIC PANEL: CPT

## 2019-03-11 NOTE — TELEPHONE ENCOUNTER
Elevated lfts in fbc triage,  Per mfm repeat in 1 week,  Orders entered, pt appraised, she is going to lab now for repeat labs.

## 2019-03-11 NOTE — PROGRESS NOTES
Problem visit:  Pt noted to have elevated bps at home, was seen in fbc triage and bps labile,  Bps today noted. Denied any headache/visual changes/abd pain.   Alert and oriented, nad  Chest cta  Heart rrr  abd soft, nt, gravid, fhts 149  Ext nt  Repeat labs

## 2019-03-14 ENCOUNTER — APPOINTMENT (OUTPATIENT)
Dept: LAB | Facility: HOSPITAL | Age: 34
End: 2019-03-14
Attending: OBSTETRICS & GYNECOLOGY
Payer: COMMERCIAL

## 2019-03-14 ENCOUNTER — ROUTINE PRENATAL (OUTPATIENT)
Dept: OBGYN CLINIC | Facility: CLINIC | Age: 34
End: 2019-03-14

## 2019-03-14 ENCOUNTER — TELEPHONE (OUTPATIENT)
Dept: OBGYN CLINIC | Facility: CLINIC | Age: 34
End: 2019-03-14

## 2019-03-14 ENCOUNTER — PATIENT MESSAGE (OUTPATIENT)
Dept: FAMILY MEDICINE CLINIC | Facility: CLINIC | Age: 34
End: 2019-03-14

## 2019-03-14 VITALS — WEIGHT: 148.38 LBS | BODY MASS INDEX: 25 KG/M2 | DIASTOLIC BLOOD PRESSURE: 82 MMHG | SYSTOLIC BLOOD PRESSURE: 126 MMHG

## 2019-03-14 DIAGNOSIS — R03.0 TRANSIENT ELEVATED BLOOD PRESSURE: ICD-10-CM

## 2019-03-14 DIAGNOSIS — Z34.82 ENCOUNTER FOR SUPERVISION OF OTHER NORMAL PREGNANCY IN SECOND TRIMESTER: ICD-10-CM

## 2019-03-14 DIAGNOSIS — R79.89 ELEVATED LFTS: Primary | ICD-10-CM

## 2019-03-14 DIAGNOSIS — R79.89 ELEVATED LFTS: ICD-10-CM

## 2019-03-14 LAB
ALBUMIN SERPL-MCNC: 2.9 G/DL (ref 3.4–5)
ALBUMIN/GLOB SERPL: 0.7 {RATIO} (ref 1–2)
ALP LIVER SERPL-CCNC: 69 U/L (ref 37–98)
ALT SERPL-CCNC: 116 U/L (ref 13–56)
ANION GAP SERPL CALC-SCNC: 6 MMOL/L (ref 0–18)
APPEARANCE: CLEAR
AST SERPL-CCNC: 50 U/L (ref 15–37)
BILIRUB SERPL-MCNC: 0.7 MG/DL (ref 0.1–2)
BUN BLD-MCNC: 10 MG/DL (ref 7–18)
BUN/CREAT SERPL: 17.2 (ref 10–20)
CALCIUM BLD-MCNC: 8.3 MG/DL (ref 8.5–10.1)
CHLORIDE SERPL-SCNC: 107 MMOL/L (ref 98–107)
CO2 SERPL-SCNC: 24 MMOL/L (ref 21–32)
CREAT BLD-MCNC: 0.58 MG/DL (ref 0.55–1.02)
GLOBULIN PLAS-MCNC: 4.1 G/DL (ref 2.8–4.4)
GLUCOSE BLD-MCNC: 72 MG/DL (ref 70–99)
HAV AB SER QL IA: NONREACTIVE
HBV CORE AB SERPL QL IA: NONREACTIVE
HBV SURFACE AB SER QL: REACTIVE
HBV SURFACE AB SERPL IA-ACNC: 16.63 MIU/ML
HBV SURFACE AG SERPL QL IA: NONREACTIVE
HCV AB SERPL QL IA: NONREACTIVE
M PROTEIN MFR SERPL ELPH: 7 G/DL (ref 6.4–8.2)
MULTISTIX LOT#: NORMAL NUMERIC
OSMOLALITY SERPL CALC.SUM OF ELEC: 282 MOSM/KG (ref 275–295)
PH, URINE: 6.5 (ref 4.5–8)
POTASSIUM SERPL-SCNC: 3.6 MMOL/L (ref 3.5–5.1)
SODIUM SERPL-SCNC: 137 MMOL/L (ref 136–145)
SPECIFIC GRAVITY: 1.01 (ref 1–1.03)
URINE-COLOR: YELLOW
UROBILINOGEN,SEMI-QN: 0.2 MG/DL (ref 0–1.9)

## 2019-03-14 PROCEDURE — 86706 HEP B SURFACE ANTIBODY: CPT

## 2019-03-14 PROCEDURE — 36415 COLL VENOUS BLD VENIPUNCTURE: CPT

## 2019-03-14 PROCEDURE — 87340 HEPATITIS B SURFACE AG IA: CPT

## 2019-03-14 PROCEDURE — 81002 URINALYSIS NONAUTO W/O SCOPE: CPT | Performed by: OBSTETRICS & GYNECOLOGY

## 2019-03-14 PROCEDURE — 80500 HEPATITIS A B + C PROFILE: CPT

## 2019-03-14 PROCEDURE — 86704 HEP B CORE ANTIBODY TOTAL: CPT

## 2019-03-14 PROCEDURE — 99212 OFFICE O/P EST SF 10 MIN: CPT | Performed by: OBSTETRICS & GYNECOLOGY

## 2019-03-14 PROCEDURE — 80053 COMPREHEN METABOLIC PANEL: CPT

## 2019-03-14 PROCEDURE — 86708 HEPATITIS A ANTIBODY: CPT

## 2019-03-14 PROCEDURE — 86803 HEPATITIS C AB TEST: CPT

## 2019-03-14 NOTE — TELEPHONE ENCOUNTER
03/14/2019 Spoke to [place] from the lab. Pt tested Reactive to Hepatitis B Surface Antibody.             Miko Harris. Dr. Pedrito Whitlock

## 2019-03-14 NOTE — PROGRESS NOTES
Pt here for f/u, home bp cuff check. Manual bp after initial 130/75. Pt's home bp cuff, old and from her cousin per pt, states 128/95, pt advised to purchase a new bp cuff and bring to office to check readings. No c/o.      Alert and oriented, nad  Selena

## 2019-03-15 NOTE — TELEPHONE ENCOUNTER
From: Hayden Bourgeois  To:  Cristine Shaikh DO  Sent: 3/14/2019 12:06 PM CDT  Subject: Referral Request    Good Morning Dr Julia Blake,    I am writing to you today because I need a referral to a Gastro Dr. I had a visit to the [facility] today and they asked to to

## 2019-03-15 NOTE — TELEPHONE ENCOUNTER
This is likely due to pt being vaccinated for hep b,  Please call pt and confirm that she has been vaccinated for Hepatitis B

## 2019-03-19 NOTE — H&P
[address] [facility] [address] Gastroenterology                                                                                                  Clinic History and Physical     Pa pregnancy  - Denies illicit drug use   - LMP: pregnant  - Lives with spouse  - NSAIDs/ASA use: PRN      History, Medications, Allergies, ROS:      Past Medical History:   Diagnosis Date   • Allergic rhinitis    • Anemia     related to cancer treatments 200 for dysuria or gross hematuria  INTEGUMENT/BREAST:  SKIN:  negative for jaundice   ALLERGIC/IMMUNOLOGIC:  negative for hay fever  ENDOCRINE:  negative for cold intolerance and heat intolerance  MUSCULOSKELETAL:  negative for joint effusion/severe erythema coupled with elevated liver enzymes, consider preeclampsia? Unlikely HELLP, acute fatty liver of pregnancy, or intrahepatic cholestasis of pregnancy given the lack of associated symptoms. Would recommend additional labs and liver ultrasound to follow-up.

## 2019-03-19 NOTE — TELEPHONE ENCOUNTER
Pt voices she did receive the Hepatitis B. Pt is not sure of the date. Pt would also like Dr. Misha Lennon to review her 03/14/19 CMP results.  Pt voices she noticed there was quite a few abnormal values with the test.

## 2019-03-20 ENCOUNTER — TELEPHONE (OUTPATIENT)
Dept: OBGYN CLINIC | Facility: CLINIC | Age: 34
End: 2019-03-20

## 2019-03-20 DIAGNOSIS — R79.89 ELEVATED LFTS: Primary | ICD-10-CM

## 2019-03-20 NOTE — PROGRESS NOTES
Hi Dr Bari Mensah is requesting a referral to Gastroenterology due to elevated AST and ALT. She has already seen Maternal Fetal Medicine and is seeing them again at 8 am tomorrow. Thank you very much for your kind help.     Chio Addison

## 2019-03-20 NOTE — TELEPHONE ENCOUNTER
Please try to reach pt, left 2 messages. Pt can be seen by [facility] today, pt to call m at [phone] and be added on to their schedule today for consult, per M nurse.

## 2019-03-20 NOTE — TELEPHONE ENCOUNTER
Called pt again and left message to call me back. Per mfm , they can see patient today for consult/poss u/s f/u due to elevated lfts.

## 2019-03-20 NOTE — TELEPHONE ENCOUNTER
Left message for MFM,  Pt informed of referral to MFM for consult due to recent abnormal labs. Number given to patient to f/u, verbalized understanding.

## 2019-03-20 NOTE — TELEPHONE ENCOUNTER
Received call from Melissa Levine at [facility] . RN informed pt aware of referral. MFM will be calling pt for apt.

## 2019-03-21 ENCOUNTER — HOSPITAL ENCOUNTER (OUTPATIENT)
Dept: PERINATAL CARE | Facility: HOSPITAL | Age: 34
Discharge: HOME | End: 2019-03-21
Attending: OBSTETRICS & GYNECOLOGY
Payer: COMMERCIAL

## 2019-03-21 VITALS
BODY MASS INDEX: 24.66 KG/M2 | DIASTOLIC BLOOD PRESSURE: 91 MMHG | HEIGHT: 65 IN | WEIGHT: 148 LBS | HEART RATE: 62 BPM | SYSTOLIC BLOOD PRESSURE: 137 MMHG

## 2019-03-21 DIAGNOSIS — O09.512 PRIMIGRAVIDA OF ADVANCED MATERNAL AGE IN SECOND TRIMESTER: ICD-10-CM

## 2019-03-21 DIAGNOSIS — R79.89 ELEVATED LFTS: ICD-10-CM

## 2019-03-21 PROCEDURE — 99243 OFF/OP CNSLTJ NEW/EST LOW 30: CPT | Performed by: OBSTETRICS & GYNECOLOGY

## 2019-03-21 RX ORDER — ASPIRIN 81 MG/1
81 TABLET, CHEWABLE ORAL DAILY
COMMUNITY
End: 2019-07-01 | Stop reason: ALTCHOICE

## 2019-03-21 NOTE — TELEPHONE ENCOUNTER
Rekha Holliday MD 9 hours ago (10:10 AM)        Hi Dr Mohamud Leon is requesting a referral to Gastroenterology due to elevated AST and ALT. She has already seen Maternal Fetal Medicine and is seeing them again at 8 am tomorrow.

## 2019-03-28 ENCOUNTER — APPOINTMENT (OUTPATIENT)
Dept: LAB | Facility: HOSPITAL | Age: 34
End: 2019-03-28
Attending: INTERNAL MEDICINE
Payer: COMMERCIAL

## 2019-03-28 ENCOUNTER — ROUTINE PRENATAL (OUTPATIENT)
Dept: OBGYN CLINIC | Facility: CLINIC | Age: 34
End: 2019-03-28

## 2019-03-28 ENCOUNTER — OFFICE VISIT (OUTPATIENT)
Dept: GASTROENTEROLOGY | Facility: CLINIC | Age: 34
End: 2019-03-28

## 2019-03-28 ENCOUNTER — TELEPHONE (OUTPATIENT)
Dept: ENDOCRINOLOGY CLINIC | Facility: CLINIC | Age: 34
End: 2019-03-28

## 2019-03-28 VITALS
SYSTOLIC BLOOD PRESSURE: 129 MMHG | WEIGHT: 150.63 LBS | DIASTOLIC BLOOD PRESSURE: 85 MMHG | HEART RATE: 80 BPM | BODY MASS INDEX: 24.79 KG/M2 | HEIGHT: 65.5 IN

## 2019-03-28 VITALS — BODY MASS INDEX: 25 KG/M2 | DIASTOLIC BLOOD PRESSURE: 88 MMHG | WEIGHT: 151 LBS | SYSTOLIC BLOOD PRESSURE: 134 MMHG

## 2019-03-28 DIAGNOSIS — Z34.92 NORMAL PREGNANCY IN SECOND TRIMESTER: Primary | ICD-10-CM

## 2019-03-28 DIAGNOSIS — Z34.02 ENCOUNTER FOR SUPERVISION OF NORMAL FIRST PREGNANCY IN SECOND TRIMESTER: ICD-10-CM

## 2019-03-28 DIAGNOSIS — E03.4 HYPOTHYROIDISM DUE TO ACQUIRED ATROPHY OF THYROID: ICD-10-CM

## 2019-03-28 DIAGNOSIS — O10.012 PRE-EXISTING ESSENTIAL HYPERTENSION DURING PREGNANCY IN SECOND TRIMESTER: ICD-10-CM

## 2019-03-28 DIAGNOSIS — E03.9 HYPOTHYROIDISM DURING PREGNANCY, ANTEPARTUM: ICD-10-CM

## 2019-03-28 DIAGNOSIS — O99.280 HYPOTHYROID IN PREGNANCY, ANTEPARTUM: Primary | ICD-10-CM

## 2019-03-28 DIAGNOSIS — E03.9 HYPOTHYROID IN PREGNANCY, ANTEPARTUM: Primary | ICD-10-CM

## 2019-03-28 DIAGNOSIS — O99.280 HYPOTHYROIDISM DURING PREGNANCY, ANTEPARTUM: ICD-10-CM

## 2019-03-28 DIAGNOSIS — R79.89 ELEVATED LFTS: Primary | ICD-10-CM

## 2019-03-28 DIAGNOSIS — R79.89 ELEVATED LFTS: ICD-10-CM

## 2019-03-28 PROCEDURE — 82105 ALPHA-FETOPROTEIN SERUM: CPT

## 2019-03-28 PROCEDURE — 86038 ANTINUCLEAR ANTIBODIES: CPT

## 2019-03-28 PROCEDURE — 82390 ASSAY OF CERULOPLASMIN: CPT

## 2019-03-28 PROCEDURE — 84443 ASSAY THYROID STIM HORMONE: CPT

## 2019-03-28 PROCEDURE — 99212 OFFICE O/P EST SF 10 MIN: CPT | Performed by: NURSE PRACTITIONER

## 2019-03-28 PROCEDURE — 99243 OFF/OP CNSLTJ NEW/EST LOW 30: CPT | Performed by: NURSE PRACTITIONER

## 2019-03-28 PROCEDURE — 84439 ASSAY OF FREE THYROXINE: CPT

## 2019-03-28 PROCEDURE — 36415 COLL VENOUS BLD VENIPUNCTURE: CPT

## 2019-03-28 PROCEDURE — 81002 URINALYSIS NONAUTO W/O SCOPE: CPT | Performed by: OBSTETRICS & GYNECOLOGY

## 2019-03-28 PROCEDURE — 83540 ASSAY OF IRON: CPT

## 2019-03-28 PROCEDURE — 82103 ALPHA-1-ANTITRYPSIN TOTAL: CPT

## 2019-03-28 PROCEDURE — 82728 ASSAY OF FERRITIN: CPT

## 2019-03-28 PROCEDURE — 84466 ASSAY OF TRANSFERRIN: CPT

## 2019-03-28 NOTE — PROGRESS NOTES
Nursing Note by Carolyn Higuera CMA at 08/29/18 09:52 AM     Author:  Carolyn Higuera CMA Service:  (none) Author Type:  Certified Medical Assistant     Filed:  08/29/18 10:02 AM Encounter Date:  8/29/2018 Status:  Addendum     :  Carolyn Higuera CMA (Certified Medical Assistant)            Following the last injection, did any of the following last longer than 24 hours?[JM1.1T] None[JM1.1M]     Has the patient receiving the injection today used the following in the past week:[JM1.1T] None[JM1.1M]     Within the past week, has the patient receiving the injection today experienced the following:[JM1.1T] None     Verified pt took antihistamine per protocol[JM1.1M].[JM1.2M]            Revision History        User Key Date/Time User Provider Type Action    > JM1.2 08/29/18 10:02 AM Carolyn Higuera CMA Certified Medical Assistant Addend     JM1.1 08/29/18 10:00 AM Carolyn Higuera CMA Certified Medical Assistant Sign    M - Manual, T - Template             To do LFTs and Bile Acids today. Saw GI today. TO do CBC and  1 hour GTT x 4 weeks.

## 2019-03-29 NOTE — TELEPHONE ENCOUNTER
Thyroid labs normal. CPM.  Please repeat TSH and Free T4 around 32 weeks of pregnancy and FU in the clinic with me  Thanks

## 2019-03-29 NOTE — TELEPHONE ENCOUNTER
Spoke with Lili. She verbalizes understanding of normal thyroid labs and will CPM. Booked follow up on 5/28. Labs ordered as written.

## 2019-04-01 ENCOUNTER — TELEPHONE (OUTPATIENT)
Dept: OBGYN CLINIC | Facility: CLINIC | Age: 34
End: 2019-04-01

## 2019-04-01 NOTE — TELEPHONE ENCOUNTER
Lmtcb. Message left to pt's [employer] account regarding message below--    --- Message from Aristides Real MD sent at 4/1/2019 11:06 AM CDT -----  Please inform by WebTuner, call, or mail of normal genetics screen (Quad, AFP Tetra, Panorama, other)--  MSAFP

## 2019-04-08 ENCOUNTER — HOSPITAL ENCOUNTER (OUTPATIENT)
Dept: ULTRASOUND IMAGING | Age: 34
Discharge: HOME | End: 2019-04-08
Attending: NURSE PRACTITIONER
Payer: COMMERCIAL

## 2019-04-08 ENCOUNTER — APPOINTMENT (OUTPATIENT)
Dept: LAB | Age: 34
End: 2019-04-08
Attending: NURSE PRACTITIONER
Payer: COMMERCIAL

## 2019-04-08 DIAGNOSIS — R79.89 ELEVATED LFTS: ICD-10-CM

## 2019-04-08 PROCEDURE — 76705 ECHO EXAM OF ABDOMEN: CPT | Performed by: NURSE PRACTITIONER

## 2019-04-14 ENCOUNTER — APPOINTMENT (OUTPATIENT)
Dept: ULTRASOUND IMAGING | Facility: HOSPITAL | Age: 34
End: 2019-04-14
Attending: EMERGENCY MEDICINE
Payer: COMMERCIAL

## 2019-04-14 ENCOUNTER — HOSPITAL ENCOUNTER (EMERGENCY)
Facility: HOSPITAL | Age: 34
Discharge: HOME | End: 2019-04-14
Attending: EMERGENCY MEDICINE
Payer: COMMERCIAL

## 2019-04-14 VITALS
OXYGEN SATURATION: 99 % | SYSTOLIC BLOOD PRESSURE: 133 MMHG | WEIGHT: 150 LBS | BODY MASS INDEX: 24.11 KG/M2 | RESPIRATION RATE: 18 BRPM | DIASTOLIC BLOOD PRESSURE: 96 MMHG | TEMPERATURE: 98 F | HEIGHT: 66 IN | HEART RATE: 70 BPM

## 2019-04-14 DIAGNOSIS — M79.605 LEFT LEG PAIN: Primary | ICD-10-CM

## 2019-04-14 PROCEDURE — 93971 EXTREMITY STUDY: CPT | Performed by: EMERGENCY MEDICINE

## 2019-04-14 PROCEDURE — 99284 EMERGENCY DEPT VISIT MOD MDM: CPT

## 2019-04-14 NOTE — ED NOTES
Pt to ER with c/o left medial ankle pain since she woke up this am. Pt denies fall or injury. Trace swelling noted to left medial ankle. No redness noted. Skin intact. Pt denies fevers. Pt denies chest pain or sob. Pt states +Fetal movement.  No respiratory

## 2019-04-14 NOTE — ED INITIAL ASSESSMENT (HPI)
Patient complain left foot pain and swelling for days. Patient is 26 weeks pregnant. Patient has a history of tibia surgery when she was 16.

## 2019-04-15 ENCOUNTER — TELEPHONE (OUTPATIENT)
Dept: FAMILY MEDICINE CLINIC | Facility: CLINIC | Age: 34
End: 2019-04-15

## 2019-04-15 NOTE — TELEPHONE ENCOUNTER
Pt was scheduled for 3/29 physical appt with Dr. Rebecca Damon, however, she had canceled the appt through [address] message to the phone room. This was missed an Pt ended up receiving the No-Show fee. Pt asking if it can be waived due to phone room oversight. Please advise.

## 2019-04-15 NOTE — ED PROVIDER NOTES
Patient Seen in: [facility] AND [facility] Emergency Department    History   Patient presents with:  Lower Extremity Injury (musculoskeletal)    Stated Complaint: L Ft Pain and Swelling    HPI    26year old female who is 32 wga and reports atraumatic pain and s Current:BP (!) 133/96   Pulse 70   Temp 98 °F (36.7 °C)   Resp 18   Ht 167.6 cm (5' 6\")   Wt 68 kg   LMP 10/10/2018 (Exact Date)   SpO2 99%   BMI 24.21 kg/m²         Physical Exam   Constitutional: She is oriented to person, place, and time.  She appears w Normal LLE US. No focal ttp, minimal swelling which pt states is improving. BP slightly elevated on initial read, much improved on repeat. EMR indicates pt already being monitored by OB for mild high bp.      Disposition and Plan     Clinical Impression:  L

## 2019-04-23 ENCOUNTER — ROUTINE PRENATAL (OUTPATIENT)
Dept: OBGYN CLINIC | Facility: CLINIC | Age: 34
End: 2019-04-23

## 2019-04-23 VITALS — BODY MASS INDEX: 26 KG/M2 | SYSTOLIC BLOOD PRESSURE: 130 MMHG | WEIGHT: 160 LBS | DIASTOLIC BLOOD PRESSURE: 78 MMHG

## 2019-04-23 DIAGNOSIS — Z23 NEED FOR VACCINATION: ICD-10-CM

## 2019-04-23 DIAGNOSIS — Z34.83 ENCOUNTER FOR SUPERVISION OF OTHER NORMAL PREGNANCY IN THIRD TRIMESTER: Primary | ICD-10-CM

## 2019-04-23 PROCEDURE — 59425 ANTEPARTUM CARE ONLY: CPT | Performed by: ADVANCED PRACTICE MIDWIFE

## 2019-04-23 PROCEDURE — 81002 URINALYSIS NONAUTO W/O SCOPE: CPT | Performed by: ADVANCED PRACTICE MIDWIFE

## 2019-04-23 PROCEDURE — 90715 TDAP VACCINE 7 YRS/> IM: CPT | Performed by: ADVANCED PRACTICE MIDWIFE

## 2019-04-23 PROCEDURE — 90471 IMMUNIZATION ADMIN: CPT | Performed by: ADVANCED PRACTICE MIDWIFE

## 2019-04-23 RX ORDER — BREAST PUMP
EACH MISCELLANEOUS
Qty: 1 EACH | Refills: 0 | Status: SHIPPED | OUTPATIENT
Start: 2019-04-23 | End: 2020-11-07 | Stop reason: ALTCHOICE

## 2019-04-24 ENCOUNTER — LAB ENCOUNTER (OUTPATIENT)
Dept: LAB | Facility: HOSPITAL | Age: 34
End: 2019-04-24
Attending: OBSTETRICS & GYNECOLOGY
Payer: COMMERCIAL

## 2019-04-24 ENCOUNTER — HOSPITAL ENCOUNTER (OUTPATIENT)
Dept: PERINATAL CARE | Facility: HOSPITAL | Age: 34
Discharge: HOME | End: 2019-04-24
Attending: OBSTETRICS & GYNECOLOGY
Payer: COMMERCIAL

## 2019-04-24 VITALS
BODY MASS INDEX: 25 KG/M2 | SYSTOLIC BLOOD PRESSURE: 142 MMHG | WEIGHT: 157 LBS | HEART RATE: 73 BPM | DIASTOLIC BLOOD PRESSURE: 88 MMHG

## 2019-04-24 DIAGNOSIS — O09.513 PRIMIGRAVIDA OF ADVANCED MATERNAL AGE IN THIRD TRIMESTER: ICD-10-CM

## 2019-04-24 DIAGNOSIS — R03.0 BORDERLINE HIGH BLOOD PRESSURE: ICD-10-CM

## 2019-04-24 DIAGNOSIS — O99.280 HYPOTHYROID IN PREGNANCY, ANTEPARTUM: ICD-10-CM

## 2019-04-24 DIAGNOSIS — E03.9 HYPOTHYROID IN PREGNANCY, ANTEPARTUM: ICD-10-CM

## 2019-04-24 DIAGNOSIS — I10 HYPERTENSION, UNSPECIFIED TYPE: ICD-10-CM

## 2019-04-24 DIAGNOSIS — Z34.92 NORMAL PREGNANCY IN SECOND TRIMESTER: ICD-10-CM

## 2019-04-24 DIAGNOSIS — I10 HYPERTENSION, UNSPECIFIED TYPE: Primary | ICD-10-CM

## 2019-04-24 PROCEDURE — 36415 COLL VENOUS BLD VENIPUNCTURE: CPT

## 2019-04-24 PROCEDURE — 76816 OB US FOLLOW-UP PER FETUS: CPT | Performed by: OBSTETRICS & GYNECOLOGY

## 2019-04-24 PROCEDURE — 99213 OFFICE O/P EST LOW 20 MIN: CPT | Performed by: OBSTETRICS & GYNECOLOGY

## 2019-04-24 PROCEDURE — 76805 OB US >/= 14 WKS SNGL FETUS: CPT | Performed by: OBSTETRICS & GYNECOLOGY

## 2019-04-24 PROCEDURE — 82950 GLUCOSE TEST: CPT

## 2019-04-24 PROCEDURE — 85025 COMPLETE CBC W/AUTO DIFF WBC: CPT

## 2019-04-24 NOTE — PROGRESS NOTES
Reason for Consult:   Dear Dr. Alcira Barclay,    Thank you for requesting maternal fetal medicine consultation and ultrasound on Angelina Laughlin. As you are aware she is a 26year old female with a Vang pregnancy.   A maternal-fetal medicine consulta • BACK SURGERY      scoliosis rods, lower back   • THYROID LOBECTOMY,UNILAT Right 09/26/2016      Family History   Problem Relation Age of Onset   • Other (Other) Mother         MS   • Diabetes Maternal Grandmother    • Heart Disease Maternal Grandfather chromosomal abnormalities. IMPRESSION:   1. IUP @  28w0d   2. Scan consistent with dates  3. No fetal structural abnormalities seen  4. Elevated liver enzymes- mild; no repeat labs since March  5.   Hypothyroidism  6.  hypertension    RECOMMENDATION

## 2019-04-24 NOTE — PROGRESS NOTES
S.  Was seeing the midwives but was transferred back for hypertension. Ranging at home 130/80. Occasionally will 140/90. She calls if it goes up. Denies HA, vision change, URQ pain. Swelling is in the left leg only.   Had surgery on this leg in the pas

## 2019-05-02 ENCOUNTER — HOSPITAL ENCOUNTER (INPATIENT)
Facility: HOSPITAL | Age: 34
LOS: 2 days | Discharge: HOME | DRG: 833 | End: 2019-05-04
Attending: OBSTETRICS & GYNECOLOGY | Admitting: OBSTETRICS & GYNECOLOGY
Payer: COMMERCIAL

## 2019-05-02 ENCOUNTER — HOSPITAL ENCOUNTER (INPATIENT)
Facility: HOSPITAL | Age: 34
LOS: 1 days | Discharge: ANOTHER HOSPITAL | DRG: 833 | End: 2019-05-02
Attending: OBSTETRICS & GYNECOLOGY | Admitting: OBSTETRICS & GYNECOLOGY
Payer: COMMERCIAL

## 2019-05-02 VITALS
HEIGHT: 65 IN | HEART RATE: 68 BPM | TEMPERATURE: 98 F | DIASTOLIC BLOOD PRESSURE: 97 MMHG | RESPIRATION RATE: 18 BRPM | SYSTOLIC BLOOD PRESSURE: 163 MMHG | WEIGHT: 160 LBS | BODY MASS INDEX: 26.66 KG/M2

## 2019-05-02 PROBLEM — O13.9 PIH (PREGNANCY INDUCED HYPERTENSION): Status: ACTIVE | Noted: 2019-05-02

## 2019-05-02 PROBLEM — O13.9 PIH (PREGNANCY INDUCED HYPERTENSION) (HCC): Status: ACTIVE | Noted: 2019-05-02

## 2019-05-02 PROCEDURE — 99223 1ST HOSP IP/OBS HIGH 75: CPT | Performed by: OBSTETRICS & GYNECOLOGY

## 2019-05-02 RX ORDER — AMMONIA INHALANTS 0.04 G/.3ML
0.3 INHALANT RESPIRATORY (INHALATION) AS NEEDED
Status: DISCONTINUED | OUTPATIENT
Start: 2019-05-02 | End: 2019-05-02

## 2019-05-02 RX ORDER — LABETALOL HYDROCHLORIDE 5 MG/ML
20 INJECTION, SOLUTION INTRAVENOUS
Status: DISCONTINUED | OUTPATIENT
Start: 2019-05-02 | End: 2019-05-02

## 2019-05-02 RX ORDER — CALCIUM GLUCONATE 94 MG/ML
1 INJECTION, SOLUTION INTRAVENOUS ONCE
Status: DISCONTINUED | OUTPATIENT
Start: 2019-05-02 | End: 2019-05-02

## 2019-05-02 RX ORDER — LABETALOL HYDROCHLORIDE 5 MG/ML
INJECTION, SOLUTION INTRAVENOUS
Status: DISCONTINUED
Start: 2019-05-02 | End: 2019-05-02

## 2019-05-02 RX ORDER — LABETALOL 200 MG/1
200 TABLET, FILM COATED ORAL EVERY 12 HOURS SCHEDULED
Status: DISCONTINUED | OUTPATIENT
Start: 2019-05-02 | End: 2019-05-02

## 2019-05-02 RX ORDER — IBUPROFEN 600 MG/1
600 TABLET ORAL ONCE AS NEEDED
Status: DISCONTINUED | OUTPATIENT
Start: 2019-05-02 | End: 2019-05-02

## 2019-05-02 RX ORDER — BETAMETHASONE SODIUM PHOSPHATE AND BETAMETHASONE ACETATE 3; 3 MG/ML; MG/ML
12 INJECTION, SUSPENSION INTRA-ARTICULAR; INTRALESIONAL; INTRAMUSCULAR; SOFT TISSUE EVERY 24 HOURS
Status: COMPLETED | OUTPATIENT
Start: 2019-05-02 | End: 2019-05-02

## 2019-05-02 RX ORDER — BETAMETHASONE SODIUM PHOSPHATE AND BETAMETHASONE ACETATE 3; 3 MG/ML; MG/ML
INJECTION, SUSPENSION INTRA-ARTICULAR; INTRALESIONAL; INTRAMUSCULAR; SOFT TISSUE
Status: DISCONTINUED
Start: 2019-05-02 | End: 2019-05-02

## 2019-05-02 RX ORDER — TRISODIUM CITRATE DIHYDRATE AND CITRIC ACID MONOHYDRATE 500; 334 MG/5ML; MG/5ML
30 SOLUTION ORAL AS NEEDED
Status: DISCONTINUED | OUTPATIENT
Start: 2019-05-02 | End: 2019-05-02

## 2019-05-02 RX ORDER — LABETALOL 200 MG/1
200 TABLET, FILM COATED ORAL EVERY 12 HOURS SCHEDULED
Status: DISCONTINUED | OUTPATIENT
Start: 2019-05-02 | End: 2019-05-04

## 2019-05-02 RX ORDER — LABETALOL 200 MG/1
TABLET, FILM COATED ORAL
Status: DISCONTINUED
Start: 2019-05-02 | End: 2019-05-02

## 2019-05-02 RX ORDER — ACETAMINOPHEN 500 MG
1000 TABLET ORAL EVERY 6 HOURS PRN
Status: DISCONTINUED | OUTPATIENT
Start: 2019-05-02 | End: 2019-05-04

## 2019-05-02 RX ORDER — LABETALOL HYDROCHLORIDE 5 MG/ML
INJECTION, SOLUTION INTRAVENOUS
Status: COMPLETED
Start: 2019-05-02 | End: 2019-05-02

## 2019-05-02 RX ORDER — SODIUM CHLORIDE, SODIUM LACTATE, POTASSIUM CHLORIDE, CALCIUM CHLORIDE 600; 310; 30; 20 MG/100ML; MG/100ML; MG/100ML; MG/100ML
INJECTION, SOLUTION INTRAVENOUS CONTINUOUS
Status: DISCONTINUED | OUTPATIENT
Start: 2019-05-02 | End: 2019-05-02

## 2019-05-02 RX ORDER — ACETAMINOPHEN 500 MG
TABLET ORAL
Status: DISPENSED
Start: 2019-05-02 | End: 2019-05-03

## 2019-05-02 RX ORDER — TERBUTALINE SULFATE 1 MG/ML
0.25 INJECTION, SOLUTION SUBCUTANEOUS AS NEEDED
Status: DISCONTINUED | OUTPATIENT
Start: 2019-05-02 | End: 2019-05-02

## 2019-05-02 RX ORDER — SODIUM CHLORIDE 0.9 % (FLUSH) 0.9 %
10 SYRINGE (ML) INJECTION AS NEEDED
Status: DISCONTINUED | OUTPATIENT
Start: 2019-05-02 | End: 2019-05-02

## 2019-05-02 RX ORDER — HYDRALAZINE HYDROCHLORIDE 20 MG/ML
INJECTION INTRAMUSCULAR; INTRAVENOUS
Status: DISCONTINUED | OUTPATIENT
Start: 2019-05-02 | End: 2019-05-02

## 2019-05-02 RX ORDER — LEVOTHYROXINE SODIUM 0.1 MG/1
100 TABLET ORAL
Status: DISCONTINUED | OUTPATIENT
Start: 2019-05-03 | End: 2019-05-04

## 2019-05-02 RX ORDER — LABETALOL HYDROCHLORIDE 5 MG/ML
20 INJECTION, SOLUTION INTRAVENOUS ONCE
Status: COMPLETED | OUTPATIENT
Start: 2019-05-02 | End: 2019-05-02

## 2019-05-02 RX ORDER — SODIUM CHLORIDE, SODIUM LACTATE, POTASSIUM CHLORIDE, CALCIUM CHLORIDE 600; 310; 30; 20 MG/100ML; MG/100ML; MG/100ML; MG/100ML
INJECTION, SOLUTION INTRAVENOUS CONTINUOUS
Status: DISCONTINUED | OUTPATIENT
Start: 2019-05-02 | End: 2019-05-04

## 2019-05-02 RX ORDER — LIDOCAINE HYDROCHLORIDE 10 MG/ML
30 INJECTION, SOLUTION EPIDURAL; INFILTRATION; INTRACAUDAL; PERINEURAL ONCE
Status: DISCONTINUED | OUTPATIENT
Start: 2019-05-02 | End: 2019-05-02

## 2019-05-02 RX ORDER — CHOLECALCIFEROL (VITAMIN D3) 25 MCG
1 TABLET,CHEWABLE ORAL DAILY
Status: DISCONTINUED | OUTPATIENT
Start: 2019-05-02 | End: 2019-05-04

## 2019-05-02 NOTE — PROGRESS NOTES
Pt transferred to [place] per transport team.  Stable condition.   [facility]

## 2019-05-02 NOTE — PROGRESS NOTES
This RN from [facility] @ the pt bedside in [facility] Rx and delivery. Received report from Volodymyr Schaffer RN . Pt is in stable condition ALert and oriented x 4  BP is 163/97  Pulse is 68.   Pt denies headache, blurred vision and epigastric pain   Fh

## 2019-05-02 NOTE — PROGRESS NOTES
[facility] HOSP - [facility]    OB/GYNE Progress Note      Kai Rojas Patient Status:  Inpatient    10/20/1985 MRN [id number]   Location [address] [facility] Attending Linnette Ramirez MD   Hosp Day # 0 PCP Samantha Liu MD  03/14/2019    K 3.6 03/14/2019     03/14/2019    CO2 24.0 03/14/2019    GLU 72 03/14/2019    CA 8.3 (L) 03/14/2019    ALB 2.9 (L) 03/14/2019    ALKPHO 69 03/14/2019    BILT 0.7 03/14/2019    TP 7.0 03/14/2019    AST 24 05/02/2019    ALT 28 05/

## 2019-05-02 NOTE — PROGRESS NOTES
Pt transferred to br #373, pt endorsed/bedside report given to Jordi Kevin. RN. Q&A and education done. DX, [address] [place] [address] and POC discussed.  Addressed RN and pts questions

## 2019-05-02 NOTE — H&P
Arnaud Martinez 94 Patient Status:  Observation    10/20/1985 MRN [id number]   Location [address] [place] [address] Attending Juan Luis Gould MD   Hosp Day # 0 PCP Samantha Whitehead MD     Date RASH  Medications:    Medications Prior to Admission:  Misc. Devices (BREAST PUMP) Does not apply Misc Spectra 9 plus DOUBLE ELECTRIC BREAST PUMP Disp: 1 each Rfl: 0    aspirin 81 MG Oral Chew Tab Chew 81 mg by mouth daily.  Disp:  Rfl:  Not T blood transfusion     Encounter for supervision of normal first pregnancy in second trimester     Other idiopathic scoliosis, lumbar region     Pregnancy                                           Lili Del Valle is at 34 and 1/7 weeks gestation.   Not in

## 2019-05-02 NOTE — IMAGING NOTE
Indication: Increased Blood Pressure.   ____________________________________________________________________________  History: Age: 26 years. Maternal age at [facility]: 26 years.   ____________________________________________________________________________  Radha Roll

## 2019-05-02 NOTE — PROGRESS NOTES
S;  Admitted for [facility] w/superimposed preclamsia  B;  1 dose of iv  Labetalol 20mg iven, f/u by 1 dose of po labetalol 200mg po and betamethsone 12mg given in triage  Per Dr Knox Sender consult w/dr Raymon Silverio.   A;  meds given  R. Pt admitted for 24 hr urine, beta

## 2019-05-02 NOTE — CONSULTS
[address] [facility] Inpatient Consultation     Date of Admission:  2019  Date of Consult:  2019     Reason for Consult:   Hypertension     History of Present Illness:  Marisa Paul is a a(n) 26year old female  with     reports that she has never smoked. She has never used smokeless tobacco. She reports that she drinks alcohol.  She reports that she does not use drugs.     Allergies:     Latex                   RASH     Medications:     Current Facility-Administered history of \"white-coat\" hypertension she likely has preeclampsia. She has had severe range hypertension necessitating IV antihypertensives. A 24 urine collection is underway.   She is received her first of 2 doses of betamethasone.       Discussion  Pre movements: normal (2), Fetal tone: normal (2), Fetal breathing movements: normal (2), Amniotic fluid volume: normal (2). Score 8 / 8. Summary:  testing is reassuring.   We discussed fetal movement counts.    ____________________________________ IV over 2 minutes). If BP is below threshold, continue to monitor BP closely. · Repeat BP measurement in 10 minutes and record results. · If either BP threshold is still exceeded, administer hydralazine (10 mg IV over 2 minutes).  If BP is below threshold additional antihypertensive medication per specific order.   · Once the aforementioned BP thresholds are achieved, repeat BP measurement every 10 minutes for 1 hour, then every 15 minutes for 1 hour, then every 30 minutes for 1 hour, and then every hour for

## 2019-05-02 NOTE — PROGRESS NOTES
S; pt ambulatory to triage obsv. D/t   elevated Bp's  B;  G1@ 29 wks gest, c/o elevated bp in AM, Dr Tricia Montgomery in dept aware of pts arrival orders rec. Pt denies s/s PIH -neg h/a,neg epigastric pain, neg blurred vision, +pedal edema(pt ahs hx of R.  Leg renetta

## 2019-05-02 NOTE — PROGRESS NOTES
Pt Allergic to LATEX,  Caution sign on door for alert. Pt instructed to notified staff members w/alg.

## 2019-05-02 NOTE — PROGRESS NOTES
Received pt through transport to [facility].  Pt is stable during the transfer and while admitting in room 117.   EFM and toco applied and report given to [place]

## 2019-05-02 NOTE — PROGRESS NOTES
Pt received via transport from [place] into  117. Report received from BECCA Gallegos RN.  EFM applied, plan of care discussed with patient and . Orientated to room and unit.   DR Anthony Hoffman notified of admission

## 2019-05-02 NOTE — CONSULTS
[facility]    Maternal-Fetal Medicine Inpatient Consultation    Date of Admission:  5/2/2019  Date of Consult:  5/2/2019    Reason for Consult:   Hypertension    History of Present Illness:  Montrell Mejia Janis Brown is a a(n) 26year old female G1 Mother         MS   • Diabetes Maternal Grandmother    • Heart Disease Maternal Grandfather    • Cancer Paternal Grandmother    • Heart Disease Maternal Uncle       reports that she has never smoked.  She has never used smokeless tobacco. She reports that s gestational age, decelerations: none  Uterine Contractions: none    NARRATIVE:   PREECLAMPSIA    Patient Review   Patient has hypertension and an elevated protein creatinine ratio.   She has a reported history of \"white-coat\" hypertension she likely has p preeclampsia include: Nulliparity,  Preeclampsia in a previous pregnancy,  Age >40 years or <18 years,  Family history of pregnancy-induced hypertension, Chronic hypertension,  Chronic renal disease,  Antiphospholipid antibody syndrome or inherited thrombo preeclampsia/eclampsia occurring late in gestation in primigravid women and followed by a normotensive pregnancy does not appear to be associated with increased remote cardiovascular risk.        A 2015 meta-analysis of individual patient data from over 75, development of preeclampsia. We suggest use of low dose aspirin in women at moderate to high risk of developing preeclampsia, but no therapy is a reasonable alternative.    No drug prevents progression to more severe disease; use of any drug in this setting If BP is below threshold, continue to monitor BP closely. Repeat BP measurement in 20 minutes and record results.   If either BP threshold is still exceeded, obtain emergency consultation from [facility] medicine, internal medicine, anesthesia, or crit hours.  [place] additional BP timing per specific order. Brice Chris. Pedrito Kearns M.D.  5/2/2019  1:38 PM    Thank you for allowing me to participate in the care of your patient.   Please do not hesitate to contact me if additional questions or concerns hema

## 2019-05-03 PROBLEM — O13.9 PIH (PREGNANCY INDUCED HYPERTENSION), ANTEPARTUM (HCC): Status: ACTIVE | Noted: 2019-05-03

## 2019-05-03 PROBLEM — O13.9 PIH (PREGNANCY INDUCED HYPERTENSION), ANTEPARTUM: Status: ACTIVE | Noted: 2019-05-03

## 2019-05-03 PROCEDURE — 99232 SBSQ HOSP IP/OBS MODERATE 35: CPT | Performed by: OBSTETRICS & GYNECOLOGY

## 2019-05-03 RX ORDER — DOCUSATE SODIUM 100 MG/1
100 CAPSULE, LIQUID FILLED ORAL 2 TIMES DAILY
Status: DISCONTINUED | OUTPATIENT
Start: 2019-05-03 | End: 2019-05-04

## 2019-05-03 RX ORDER — ZOLPIDEM TARTRATE 5 MG/1
5 TABLET ORAL NIGHTLY PRN
Status: DISCONTINUED | OUTPATIENT
Start: 2019-05-03 | End: 2019-05-04

## 2019-05-03 RX ORDER — CALCIUM CARBONATE 200(500)MG
1000 TABLET,CHEWABLE ORAL
Status: DISCONTINUED | OUTPATIENT
Start: 2019-05-03 | End: 2019-05-04

## 2019-05-03 RX ORDER — SODIUM CHLORIDE, SODIUM LACTATE, POTASSIUM CHLORIDE, CALCIUM CHLORIDE 600; 310; 30; 20 MG/100ML; MG/100ML; MG/100ML; MG/100ML
INJECTION, SOLUTION INTRAVENOUS CONTINUOUS
Status: DISCONTINUED | OUTPATIENT
Start: 2019-05-03 | End: 2019-05-04

## 2019-05-03 RX ORDER — BETAMETHASONE SODIUM PHOSPHATE AND BETAMETHASONE ACETATE 3; 3 MG/ML; MG/ML
12 INJECTION, SUSPENSION INTRA-ARTICULAR; INTRALESIONAL; INTRAMUSCULAR; SOFT TISSUE ONCE
Status: COMPLETED | OUTPATIENT
Start: 2019-05-03 | End: 2019-05-03

## 2019-05-03 NOTE — PROGRESS NOTES
Antepartum Progress Note   S: no HA or blurred vision.  No UC    O:  Temp:  [98.2 °F (36.8 °C)-98.4 °F (36.9 °C)] 98.2 °F (36.8 °C)  Pulse:  [63-88] 85  Resp:  [16-18] 16  BP: (119-174)/() 135/76  FHT:  baseline  140s,  adequate variability, accels ap

## 2019-05-03 NOTE — PROGRESS NOTES
Updated Dr. Merry Montoya updated on IV infiltration. Per MD, may discontinue IV this time. Pt may be up to shower.

## 2019-05-03 NOTE — H&P
[facility]    History & Physical    Neil Rosario Patient Status:  Observation    10/20/1985 MRN [id number]   Location [address] [facility] Attending Gemma Leroy DO   Hosp Day # 0 PCP Samantha Whitley MD     Date of Admission:   to Admission:  Levothyroxine Sodium 100 MCG Oral Tab Take 1 tablet (100 mcg total) by mouth before breakfast. Disp: 30 tablet Rfl: 3 5/1/2019 at Unknown time   Prenatal Vit-Fe Fumarate-FA (PRENAPLUS) 27-1 MG Oral Tab Take by mouth.  Disp:  Rfl:  5/1/2019 at

## 2019-05-03 NOTE — CONSULTS
[facility]    Maternal Fetal Medicine Progress Note    Gretchen Bad Patient Status:  Observation    10/20/1985 MRN [id number]   Location [address] [facility] Attending Hardy Nuñez DO   Hosp Day # 0 PCP Samantha Angel MD     SUB at bedside. Total Time spent with patient and coordinating care:  25 minutes    Halle Oliva M.D.  5/3/2019  8:19 AM

## 2019-05-03 NOTE — PLAN OF CARE
Problem: ANTEPARTUM/LABOR and DELIVERY  Goal: Maintain pregnancy as long as maternal and/or fetal condition is stable  Description  INTERVENTIONS:  - Maternal surveillance  - Fetal surveillance  - Monitor uterine activity  - Medications as ordered  - Bed Progressing  5/3/2019 0758 by Eligio Gomez RN  Outcome: Progressing  Goal: Patient/Family Short Term Goal  Description  Patient's Short Term Goal: to maintain blood pressures within normal limits    Interventions:   -   - See additional Care Plan goals

## 2019-05-04 VITALS
HEART RATE: 65 BPM | SYSTOLIC BLOOD PRESSURE: 129 MMHG | DIASTOLIC BLOOD PRESSURE: 72 MMHG | OXYGEN SATURATION: 98 % | TEMPERATURE: 98 F | RESPIRATION RATE: 16 BRPM

## 2019-05-04 RX ORDER — LABETALOL 200 MG/1
200 TABLET, FILM COATED ORAL EVERY 12 HOURS SCHEDULED
Qty: 60 TABLET | Refills: 0 | Status: ON HOLD | OUTPATIENT
Start: 2019-05-04 | End: 2019-05-11

## 2019-05-04 NOTE — CONSULTS
[facility]    Maternal Fetal Medicine Progress Note    Bryn Sic Patient Status:  Observation    10/20/1985 MRN [id number]   Location [address] [facility] Attending Linda Vera, [address] [facility] Day # 2 PCP Samantha Valladares MD     SUB

## 2019-05-04 NOTE — PLAN OF CARE
Problem: ANTEPARTUM/LABOR and DELIVERY  Goal: Maintain pregnancy as long as maternal and/or fetal condition is stable  Description  INTERVENTIONS:  - Maternal surveillance  - Fetal surveillance  - Monitor uterine activity  - Medications as ordered  - Bed 5/4/2019 1301 by Boy Andino, RN  Outcome: Adequate for Discharge  5/4/2019 5492 by Boy Andino, RN  Outcome: Progressing  Goal: Patient/Family Short Term Goal  Description  Patient's Short Term Goal: to maintain blood pressures within normal l

## 2019-05-04 NOTE — PROGRESS NOTES
Pt discharged home via wheelchair  Pt in stable condition  Pt accompanied zunilda  Discharge instructions given  Pt to follow up call with Dr. Ashleigh Casper  on  Monday 5/6 /19  Pt to keep appointment with Dr. Darshan Stack on Tuesday 5/7/19

## 2019-05-05 ENCOUNTER — HOSPITAL ENCOUNTER (INPATIENT)
Facility: HOSPITAL | Age: 34
LOS: 6 days | Discharge: HOME | End: 2019-05-11
Attending: OBSTETRICS & GYNECOLOGY | Admitting: OBSTETRICS & GYNECOLOGY
Payer: COMMERCIAL

## 2019-05-05 PROCEDURE — 3E0P7VZ INTRODUCTION OF HORMONE INTO FEMALE REPRODUCTIVE, VIA NATURAL OR ARTIFICIAL OPENING: ICD-10-PCS | Performed by: OBSTETRICS & GYNECOLOGY

## 2019-05-05 RX ORDER — DEXTROSE, SODIUM CHLORIDE, SODIUM LACTATE, POTASSIUM CHLORIDE, AND CALCIUM CHLORIDE 5; .6; .31; .03; .02 G/100ML; G/100ML; G/100ML; G/100ML; G/100ML
INJECTION, SOLUTION INTRAVENOUS AS NEEDED
Status: DISCONTINUED | OUTPATIENT
Start: 2019-05-05 | End: 2019-05-08 | Stop reason: HOSPADM

## 2019-05-05 RX ORDER — ZOLPIDEM TARTRATE 5 MG/1
5 TABLET ORAL NIGHTLY PRN
Status: DISCONTINUED | OUTPATIENT
Start: 2019-05-05 | End: 2019-05-08

## 2019-05-05 RX ORDER — CALCIUM GLUCONATE 94 MG/ML
1 INJECTION, SOLUTION INTRAVENOUS ONCE AS NEEDED
Status: DISCONTINUED | OUTPATIENT
Start: 2019-05-05 | End: 2019-05-08

## 2019-05-05 RX ORDER — LABETALOL HYDROCHLORIDE 5 MG/ML
20 INJECTION, SOLUTION INTRAVENOUS
Status: DISCONTINUED | OUTPATIENT
Start: 2019-05-05 | End: 2019-05-08 | Stop reason: HOSPADM

## 2019-05-05 RX ORDER — CHOLECALCIFEROL (VITAMIN D3) 25 MCG
1 TABLET,CHEWABLE ORAL ONCE
Status: DISCONTINUED | OUTPATIENT
Start: 2019-05-05 | End: 2019-05-11

## 2019-05-05 RX ORDER — ACETAMINOPHEN 500 MG
1000 TABLET ORAL EVERY 6 HOURS PRN
Status: DISCONTINUED | OUTPATIENT
Start: 2019-05-05 | End: 2019-05-08

## 2019-05-05 RX ORDER — IBUPROFEN 600 MG/1
600 TABLET ORAL ONCE AS NEEDED
Status: ACTIVE | OUTPATIENT
Start: 2019-05-05 | End: 2019-05-07

## 2019-05-05 RX ORDER — TERBUTALINE SULFATE 1 MG/ML
0.25 INJECTION, SOLUTION SUBCUTANEOUS AS NEEDED
Status: DISCONTINUED | OUTPATIENT
Start: 2019-05-05 | End: 2019-05-08

## 2019-05-05 RX ORDER — TRISODIUM CITRATE DIHYDRATE AND CITRIC ACID MONOHYDRATE 500; 334 MG/5ML; MG/5ML
30 SOLUTION ORAL AS NEEDED
Status: DISCONTINUED | OUTPATIENT
Start: 2019-05-05 | End: 2019-05-08 | Stop reason: HOSPADM

## 2019-05-05 RX ORDER — LABETALOL 200 MG/1
200 TABLET, FILM COATED ORAL EVERY 12 HOURS SCHEDULED
Status: DISCONTINUED | OUTPATIENT
Start: 2019-05-05 | End: 2019-05-08 | Stop reason: ALTCHOICE

## 2019-05-05 RX ORDER — HYDRALAZINE HYDROCHLORIDE 20 MG/ML
INJECTION INTRAMUSCULAR; INTRAVENOUS
Status: DISCONTINUED | OUTPATIENT
Start: 2019-05-05 | End: 2019-05-08 | Stop reason: HOSPADM

## 2019-05-05 RX ORDER — LEVOTHYROXINE SODIUM 0.1 MG/1
100 TABLET ORAL
Status: DISCONTINUED | OUTPATIENT
Start: 2019-05-06 | End: 2019-05-11

## 2019-05-05 RX ORDER — ONDANSETRON 2 MG/ML
4 INJECTION INTRAMUSCULAR; INTRAVENOUS EVERY 6 HOURS PRN
Status: DISCONTINUED | OUTPATIENT
Start: 2019-05-05 | End: 2019-05-11

## 2019-05-05 RX ORDER — SODIUM CHLORIDE, SODIUM LACTATE, POTASSIUM CHLORIDE, CALCIUM CHLORIDE 600; 310; 30; 20 MG/100ML; MG/100ML; MG/100ML; MG/100ML
INJECTION, SOLUTION INTRAVENOUS CONTINUOUS
Status: DISCONTINUED | OUTPATIENT
Start: 2019-05-05 | End: 2019-05-08

## 2019-05-05 NOTE — H&P
[facility]    History & Physical    Kenneth Cohen Patient Status:  Inpatient    10/20/1985 MRN [id number]   Location [address] [facility] Attending Carmen Chahal,    Hosp Day # 0 PCP Samantha Whitley MD     Date of Admission:   • Heart Disease Maternal Uncle      Social History: Social History    Tobacco Use      Smoking status: Never Smoker      Smokeless tobacco: Never Used    Alcohol use: Yes      Comment: Pt is currently Pregnant       Home Meds:   Medications Prior to Adm All questions answered, all appropriate consents will be signed at the Hospital. Admission is planned for today. Intervention: vaginal/cervical Prostin.     Ascencion Hill  5/5/2019  1:57 PM

## 2019-05-06 PROCEDURE — 3E033VJ INTRODUCTION OF OTHER HORMONE INTO PERIPHERAL VEIN, PERCUTANEOUS APPROACH: ICD-10-PCS | Performed by: OBSTETRICS & GYNECOLOGY

## 2019-05-06 NOTE — PROGRESS NOTES
Pt sitting up in bed visiting with  and brother. Tolerating water and constantin crackers without nausea/vomiting. Resps spontaneous and unlabored. POC discussed, questions answered.

## 2019-05-06 NOTE — PROGRESS NOTES
EFMs applied, FHTs 150. Pt states she has a mild HA, denies blurred vision/seeing spots, and epigastric pain. Pt denies ctxs, LOF, and vaginal bleeding. +FM. Pt was admitted previously for elevated BPs and received BMZ.  Pt denies any other problems with pr

## 2019-05-06 NOTE — PLAN OF CARE
POC discussed, questions answered.  taken to NICU for tour per .  Pt positioned for comfort and encouraged to rest

## 2019-05-06 NOTE — PROGRESS NOTES
Patient was checked by me at 1:30 in the afternoon today. Cervix is barely reachable and is thick and high. The second Cervidil is due to be removed at 3 PM.  Plan was discussed with patient and her . Will allow her to eat and stop the magnesium.

## 2019-05-06 NOTE — PROGRESS NOTES
Antepartum Progress Note    S: very sleepy.  On Mg  Temp:  [97 °F (36.1 °C)-98.2 °F (36.8 °C)] 98.2 °F (36.8 °C)  Pulse:  [61-89] 69  Resp:  [16-20] 20  BP: (112-171)/() 112/68  FHT:  baseline 140s, adequate variability, accels appreciated,   [place]:  c

## 2019-05-06 NOTE — PLAN OF CARE
Problem: ANTEPARTUM/LABOR and DELIVERY  Goal: Maintain pregnancy as long as maternal and/or fetal condition is stable  Description  INTERVENTIONS:  - Maternal surveillance  - Fetal surveillance  - Monitor uterine activity  - Medications as ordered  - Bed Monitor fetal heart rate  - Monitor uterine activity  - Monitor labor progression (vaginal delivery)  - DVT prophylaxis (C/S delivery)  - Surgical antibiotic prophylaxis (C/S delivery)  Outcome: Progressing     Problem: PAIN - ADULT  Goal: Verbalizes/displ

## 2019-05-07 ENCOUNTER — ANESTHESIA (OUTPATIENT)
Dept: OBGYN UNIT | Facility: HOSPITAL | Age: 34
End: 2019-05-07

## 2019-05-07 ENCOUNTER — ANESTHESIA EVENT (OUTPATIENT)
Dept: OBGYN UNIT | Facility: HOSPITAL | Age: 34
End: 2019-05-07

## 2019-05-07 PROCEDURE — 59514 CESAREAN DELIVERY ONLY: CPT | Performed by: OBSTETRICS & GYNECOLOGY

## 2019-05-07 RX ORDER — HYDROMORPHONE HYDROCHLORIDE 1 MG/ML
INJECTION, SOLUTION INTRAMUSCULAR; INTRAVENOUS; SUBCUTANEOUS
Status: DISPENSED
Start: 2019-05-07 | End: 2019-05-08

## 2019-05-07 RX ORDER — SODIUM CHLORIDE, SODIUM LACTATE, POTASSIUM CHLORIDE, CALCIUM CHLORIDE 600; 310; 30; 20 MG/100ML; MG/100ML; MG/100ML; MG/100ML
INJECTION, SOLUTION INTRAVENOUS CONTINUOUS
Status: DISCONTINUED | OUTPATIENT
Start: 2019-05-07 | End: 2019-05-08

## 2019-05-07 RX ORDER — METOCLOPRAMIDE HYDROCHLORIDE 5 MG/ML
10 INJECTION INTRAMUSCULAR; INTRAVENOUS ONCE
Status: COMPLETED | OUTPATIENT
Start: 2019-05-07 | End: 2019-05-07

## 2019-05-07 RX ORDER — HYDROMORPHONE HYDROCHLORIDE 1 MG/ML
INJECTION, SOLUTION INTRAMUSCULAR; INTRAVENOUS; SUBCUTANEOUS
Status: COMPLETED
Start: 2019-05-07 | End: 2019-05-07

## 2019-05-07 RX ORDER — NALOXONE HYDROCHLORIDE 0.4 MG/ML
0.08 INJECTION, SOLUTION INTRAMUSCULAR; INTRAVENOUS; SUBCUTANEOUS
Status: DISCONTINUED | OUTPATIENT
Start: 2019-05-07 | End: 2019-05-11

## 2019-05-07 RX ORDER — METOCLOPRAMIDE 10 MG/1
10 TABLET ORAL ONCE
Status: COMPLETED | OUTPATIENT
Start: 2019-05-07 | End: 2019-05-07

## 2019-05-07 RX ORDER — ONDANSETRON 2 MG/ML
4 INJECTION INTRAMUSCULAR; INTRAVENOUS EVERY 6 HOURS PRN
Status: DISCONTINUED | OUTPATIENT
Start: 2019-05-07 | End: 2019-05-11

## 2019-05-07 RX ORDER — CEFAZOLIN SODIUM/WATER 2 G/20 ML
2 SYRINGE (ML) INTRAVENOUS ONCE
Status: DISCONTINUED | OUTPATIENT
Start: 2019-05-07 | End: 2019-05-08 | Stop reason: HOSPADM

## 2019-05-07 RX ORDER — NALBUPHINE HCL 10 MG/ML
2.5 AMPUL (ML) INJECTION
Status: DISCONTINUED | OUTPATIENT
Start: 2019-05-07 | End: 2019-05-08 | Stop reason: HOSPADM

## 2019-05-07 RX ORDER — ONDANSETRON 2 MG/ML
4 INJECTION INTRAMUSCULAR; INTRAVENOUS ONCE AS NEEDED
Status: ACTIVE | OUTPATIENT
Start: 2019-05-07 | End: 2019-05-07

## 2019-05-07 RX ORDER — TRISODIUM CITRATE DIHYDRATE AND CITRIC ACID MONOHYDRATE 500; 334 MG/5ML; MG/5ML
30 SOLUTION ORAL ONCE
Status: DISCONTINUED | OUTPATIENT
Start: 2019-05-07 | End: 2019-05-08 | Stop reason: HOSPADM

## 2019-05-07 RX ORDER — KETOROLAC TROMETHAMINE 30 MG/ML
30 INJECTION, SOLUTION INTRAMUSCULAR; INTRAVENOUS ONCE AS NEEDED
Status: ACTIVE | OUTPATIENT
Start: 2019-05-07 | End: 2019-05-07

## 2019-05-07 RX ORDER — NALBUPHINE HCL 10 MG/ML
2.5 AMPUL (ML) INJECTION EVERY 4 HOURS PRN
Status: DISCONTINUED | OUTPATIENT
Start: 2019-05-07 | End: 2019-05-11

## 2019-05-07 RX ORDER — HYDROMORPHONE HYDROCHLORIDE 1 MG/ML
0.4 INJECTION, SOLUTION INTRAMUSCULAR; INTRAVENOUS; SUBCUTANEOUS EVERY 5 MIN PRN
Status: DISCONTINUED | OUTPATIENT
Start: 2019-05-07 | End: 2019-05-08

## 2019-05-07 RX ORDER — CALCIUM GLUCONATE 94 MG/ML
1 INJECTION, SOLUTION INTRAVENOUS AS NEEDED
Status: DISCONTINUED | OUTPATIENT
Start: 2019-05-07 | End: 2019-05-08

## 2019-05-07 RX ORDER — DIPHENHYDRAMINE HYDROCHLORIDE 50 MG/ML
25 INJECTION INTRAMUSCULAR; INTRAVENOUS ONCE AS NEEDED
Status: ACTIVE | OUTPATIENT
Start: 2019-05-07 | End: 2019-05-07

## 2019-05-07 RX ORDER — DIPHENHYDRAMINE HYDROCHLORIDE 50 MG/ML
12.5 INJECTION INTRAMUSCULAR; INTRAVENOUS EVERY 4 HOURS PRN
Status: DISCONTINUED | OUTPATIENT
Start: 2019-05-07 | End: 2019-05-11

## 2019-05-07 RX ORDER — LABETALOL HYDROCHLORIDE 5 MG/ML
INJECTION, SOLUTION INTRAVENOUS
Status: COMPLETED
Start: 2019-05-07 | End: 2019-05-07

## 2019-05-07 RX ORDER — CEFAZOLIN SODIUM/WATER 2 G/20 ML
SYRINGE (ML) INTRAVENOUS
Status: DISPENSED
Start: 2019-05-07 | End: 2019-05-08

## 2019-05-07 RX ORDER — CARBOPROST TROMETHAMINE 250 UG/ML
INJECTION, SOLUTION INTRAMUSCULAR
Status: DISPENSED
Start: 2019-05-07 | End: 2019-05-08

## 2019-05-07 RX ORDER — FAMOTIDINE 10 MG/ML
20 INJECTION, SOLUTION INTRAVENOUS ONCE
Status: COMPLETED | OUTPATIENT
Start: 2019-05-07 | End: 2019-05-07

## 2019-05-07 RX ORDER — FAMOTIDINE 20 MG/1
20 TABLET ORAL ONCE
Status: COMPLETED | OUTPATIENT
Start: 2019-05-07 | End: 2019-05-07

## 2019-05-07 NOTE — PROGRESS NOTES
Patient feels mild contractions    BP (!) 150/91 (BP Location: Right arm)   Pulse 69   Temp 97.5 °F (36.4 °C) (Temporal)   Resp 18   Ht 65\"   Wt 166 lb   LMP 10/10/2018 (Exact Date)   SpO2 94%   BMI 27.62 kg/m²     TOCO; mild 3-4  FHT: 130s, no decels, mo

## 2019-05-07 NOTE — OPERATIVE REPORT
[facility]    Post-Operative Note    Valliteresa Bad Patient Status:  Inpatient    10/20/1985 MRN [id number]   Location [address] [facility] Attending Hardy Nuñez, [address] [facility] Day # 2 PCP Samantha Whitley MD       Preoperative Diagnosi using a wet lap. The cervix was dilated with a ring forcep which was then passed off of the field. The bladder blade was replaced. The first layer of the hysterotomy was closed using 0-vicryl. A second imbricating layer was placed with same suture.   Luis Tran

## 2019-05-07 NOTE — ANESTHESIA PREPROCEDURE EVALUATION
PRE-OP EVALUATION    Patient Name: Lamin Tobin    Pre-op Diagnosis: primary cesearean section r/t severe preecampsia and failed IOL    Procedure(s):      Surgeon(s) and Role:     * Tika Jean DO - Jamarcus    Pre-op vitals reviewed. Temp: 98. 4 injection 0.4 mg 0.4 mg Intravenous Q5 Min PRN   HYDROmorphone HCl (DILAUDID) 1 MG/ML injection      oxyTOCIN (PITOCIN) 30 units/ 500 ml 0.9% NS premix infusion      [COMPLETED] dinoprostone (CERVIDIL) vaginal insert 10 mg 10 mg Vaginal Once   lactated rin Intramuscular Once   [COMPLETED] Labetalol HCl (TRANDATE) injection 20 mg 20 mg Intravenous Once   [COMPLETED] betamethasone sod phos & acet (CELESTONE) 6 (3-3) MG/ML injection 12 mg 12 mg Intramuscular Q24H   [] acetaminophen (TYLENOL EXTRA STRENGT Available pre-op labs reviewed.   Lab Results   Component Value Date    WBC 11.2 (H) 05/05/2019    RBC 3.85 05/05/2019    HGB 12.7 05/05/2019    HCT 36.8 05/05/2019    MCV 95.6 05/05/2019    MCH 33.0 05/05/2019    MCHC 34.5 05/05/2019    RDW 13.7 05/05/

## 2019-05-07 NOTE — ANESTHESIA POSTPROCEDURE EVALUATION
[address] [facility] Patient Status:  Inpatient   Age/Gender 26year old female MRN [id number]   Location [address] [facility] Attending Faraz Bishop, [address] [facility] Day # 2 PCP Samantha Whitley MD       Anesthesia Post-op Note    *

## 2019-05-07 NOTE — BH PROGRESS NOTE
Maternal Mental Health/Psych Liaison  At request of treatment team and following team rounds, Lois Evans met with patient and  to provide education regarding maternal mental health support available via [employer] post-delivery.   Contact information was s

## 2019-05-07 NOTE — CM/SW NOTE
Team rounds done on patient. Team reviewed patient orders, patient plan of care, and possible discharge needs. Team members present: MITCHELL Pena, Behavioral Health; ELAINE Fiore; Paris Donato RN CM; and RN caring for patient.

## 2019-05-07 NOTE — PLAN OF CARE
Problem: ANTEPARTUM/LABOR and DELIVERY  Goal: Maintain pregnancy as long as maternal and/or fetal condition is stable  Description  INTERVENTIONS:  - Maternal surveillance  - Fetal surveillance  - Monitor uterine activity  - Medications as ordered  - Bed reassuring during the birth process  Description  INTERVENTIONS:  - Monitor vital signs  - Monitor fetal heart rate  - Monitor uterine activity  - Monitor labor progression (vaginal delivery)  - DVT prophylaxis (C/S delivery)  - Surgical antibiotic prophyl Problem: SKIN/TISSUE INTEGRITY - ADULT  Goal: Skin integrity remains intact  Description  INTERVENTIONS  - Assess and document risk factors for pressure ulcer development  - Assess and document skin integrity  - Monitor for areas of redness and/or skin b

## 2019-05-08 PROCEDURE — 99252 IP/OBS CONSLTJ NEW/EST SF 35: CPT | Performed by: INTERNAL MEDICINE

## 2019-05-08 RX ORDER — IBUPROFEN 600 MG/1
600 TABLET ORAL EVERY 6 HOURS SCHEDULED
Status: DISCONTINUED | OUTPATIENT
Start: 2019-05-08 | End: 2019-05-11

## 2019-05-08 RX ORDER — HYDROCODONE BITARTRATE AND ACETAMINOPHEN 5; 325 MG/1; MG/1
1 TABLET ORAL EVERY 4 HOURS PRN
Status: DISCONTINUED | OUTPATIENT
Start: 2019-05-08 | End: 2019-05-11

## 2019-05-08 RX ORDER — HYDROCODONE BITARTRATE AND ACETAMINOPHEN 10; 325 MG/1; MG/1
1 TABLET ORAL EVERY 4 HOURS PRN
Status: DISCONTINUED | OUTPATIENT
Start: 2019-05-08 | End: 2019-05-11

## 2019-05-08 RX ORDER — IBUPROFEN 600 MG/1
600 TABLET ORAL EVERY 6 HOURS SCHEDULED
Status: DISCONTINUED | OUTPATIENT
Start: 2019-05-09 | End: 2019-05-08

## 2019-05-08 RX ORDER — DOCUSATE SODIUM 100 MG/1
100 CAPSULE, LIQUID FILLED ORAL
Status: DISCONTINUED | OUTPATIENT
Start: 2019-05-09 | End: 2019-05-08

## 2019-05-08 RX ORDER — LABETALOL HYDROCHLORIDE 5 MG/ML
20 INJECTION, SOLUTION INTRAVENOUS ONCE AS NEEDED
Status: DISCONTINUED | OUTPATIENT
Start: 2019-05-08 | End: 2019-05-11

## 2019-05-08 RX ORDER — SIMETHICONE 80 MG
80 TABLET,CHEWABLE ORAL 3 TIMES DAILY PRN
Status: DISCONTINUED | OUTPATIENT
Start: 2019-05-08 | End: 2019-05-11

## 2019-05-08 RX ORDER — BISACODYL 10 MG
10 SUPPOSITORY, RECTAL RECTAL
Status: DISCONTINUED | OUTPATIENT
Start: 2019-05-08 | End: 2019-05-11

## 2019-05-08 RX ORDER — ZOLPIDEM TARTRATE 5 MG/1
5 TABLET ORAL NIGHTLY PRN
Status: DISCONTINUED | OUTPATIENT
Start: 2019-05-08 | End: 2019-05-11

## 2019-05-08 RX ORDER — LABETALOL HYDROCHLORIDE 5 MG/ML
40 INJECTION, SOLUTION INTRAVENOUS ONCE AS NEEDED
Status: DISCONTINUED | OUTPATIENT
Start: 2019-05-08 | End: 2019-05-11

## 2019-05-08 RX ORDER — LABETALOL 200 MG/1
200 TABLET, FILM COATED ORAL EVERY 8 HOURS SCHEDULED
Status: DISCONTINUED | OUTPATIENT
Start: 2019-05-08 | End: 2019-05-11

## 2019-05-08 RX ORDER — DOCUSATE SODIUM 100 MG/1
100 CAPSULE, LIQUID FILLED ORAL
Status: DISCONTINUED | OUTPATIENT
Start: 2019-05-08 | End: 2019-05-11

## 2019-05-08 RX ORDER — LABETALOL HYDROCHLORIDE 5 MG/ML
INJECTION, SOLUTION INTRAVENOUS
Status: DISCONTINUED
Start: 2019-05-08 | End: 2019-05-08 | Stop reason: WASHOUT

## 2019-05-08 RX ORDER — HYDRALAZINE HYDROCHLORIDE 20 MG/ML
10 INJECTION INTRAMUSCULAR; INTRAVENOUS ONCE AS NEEDED
Status: DISCONTINUED | OUTPATIENT
Start: 2019-05-08 | End: 2019-05-11

## 2019-05-08 RX ORDER — LABETALOL HYDROCHLORIDE 5 MG/ML
80 INJECTION, SOLUTION INTRAVENOUS ONCE AS NEEDED
Status: DISCONTINUED | OUTPATIENT
Start: 2019-05-08 | End: 2019-05-11

## 2019-05-08 NOTE — PROGRESS NOTES
Report to Bob Nava RN at this time. POC discussed and enforced. Pt stable in bed with IV infusing and call light in reach. Pt verbalized understanding of POC.

## 2019-05-08 NOTE — PROGRESS NOTES
Report from Misty @ this time. POC discussed and will enforce. Pt stable in bed with IV infusing and call light in reach. Pt verbalized understanding of POC. Will continue to monitor.

## 2019-05-08 NOTE — PROGRESS NOTES
Pt up to br for shahab care and tolerated well, Hay removed. Assisted to wheelchair for transfer and patient tolerated well. Pt Transferred to 2207 by wheelchair with . Stopped at NICU but infant having a   procedure being done.   Mom will go ba

## 2019-05-08 NOTE — PLAN OF CARE
Problem: SAFETY ADULT - FALL  Goal: Free from fall injury  Description  INTERVENTIONS:  - Assess pt frequently for physical needs  - Identify cognitive and physical deficits and behaviors that affect risk of falls.   - [place] fall precautions as indica emotional support, including active listening and acknowledgement of concerns of patient and caregivers  - Reduce environmental stimuli, as able  - Instruct patient/family in relaxation techniques, as appropriate  - Assess for spiritual and psychosocial ne and breast pumps). - Encourage mother/other family members to express feelings/concerns, and actively listen. - Educate father/SO about benefits of breast feeding and how to manage common lactation challenges.   - Recommend avoidance of specific medicatio

## 2019-05-08 NOTE — PROGRESS NOTES
[facility]  Post-Partum Caesarean Section Progress Note    Frederick Gutierrez Patient Status:  Inpatient    10/20/1985 MRN [id number]   Location [address] [facility] Attending Carlton Bianchi, [address] [facility] Day # 3 PCP MD Josefina Garcia \[facility]\""

## 2019-05-09 PROCEDURE — 99232 SBSQ HOSP IP/OBS MODERATE 35: CPT | Performed by: HOSPITALIST

## 2019-05-09 NOTE — PROGRESS NOTES
Patient received in room 2207 after going to NICU to visit baby. Unable to visit baby in NICU, due to procedures for baby. Patient requested to stay in wheelchair until able to go to NICU. Instruction and plan of care reviewed.

## 2019-05-09 NOTE — PLAN OF CARE
Problem: SKIN/TISSUE INTEGRITY - ADULT  Goal: Skin integrity remains intact  Description  INTERVENTIONS  - Assess and document risk factors for pressure ulcer development  - Assess and document skin integrity  - Monitor for areas of redness and/or skin b incision/episiotomy/laceration, and assess for signs and symptoms of infection and hematoma. - Assess bladder function and monitor for bladder distention.  - Provide/instruct/assist with pericare as needed. - Provide VTE prophylaxis as needed.   - Monitor feeding on demand.  - Encourage skin-to-skin contact. - Provide  support as needed. - Assess for and manage engorgement. - Provide breast feeding education handouts and information on community breast feeding support.    Outcome: Progressing  Goal: Est

## 2019-05-09 NOTE — PROGRESS NOTES
[facility]  Progress Note     Harleynabeel Meyer Patient Status:  Inpatient    10/20/1985 MRN [id number]   [facility] 2SW-J Attending Floyd Isaac, [address] [facility] Day # 4 PCP Samantha Whitley MD     Chief Complaint: HTN     S: Patient Fe input(s): PTP, INR in the last 168 hours. No results for input(s): TROP, CK in the last 168 hours. Imaging: Imaging data reviewed in Epic.     Medications:   • ibuprofen  600 mg Oral 4 times per day   • docusate sodium  100 mg Oral [email or web address]

## 2019-05-09 NOTE — PROGRESS NOTES
POD#2    Pt has no complaints, lochia min   05/09/19  1317   BP: (!) 151/95   Pulse: 109   Resp: 17   Temp: 98.8 °F (37.1 °C)     Recent Labs   Lab 05/03/19  0801 05/05/19  1150 05/08/19  0550   RBC 3.84 3.85 3.37*   HGB 12.6 12.7 11.1*   HCT 36.5 36.8 32.

## 2019-05-09 NOTE — CONSULTS
[facility]  CONSULT     Roberta Gilford Patient Status:  Inpatient    10/20/1985 MRN [id number]   [facility] [place] 2SW-J Attending Jory Kebede, [address] [facility] Day # 3 PCP Samantha Whitley MD     Reason for consult: HTN    Requested by: P Outpatient Medications on File Prior to Encounter:  Levothyroxine Sodium 100 MCG Oral Tab Take 1 tablet (100 mcg total) by mouth before breakfast. Disp: 30 tablet Rfl: 3   Prenatal Vit-Fe Fumarate-FA (PRENAPLUS) 27-1 MG Oral Tab Take by mouth.  Disp:  Rfl: 05/03/19  0801 05/05/19  1150 05/08/19  0550   GLU 92 78 91   BUN 10 15 16   CREATSERUM 0.73 0.75 0.72   GFRAA 123 119 125   GFRNAA 107 104 109   CA 9.2 8.6 6.3*   ALB 2.7* 2.6* 2.0*    139 136   K 3.7 3.9 4.3    109 106   CO2 20.0* 24.0 24.0

## 2019-05-10 PROCEDURE — 99232 SBSQ HOSP IP/OBS MODERATE 35: CPT | Performed by: HOSPITALIST

## 2019-05-10 NOTE — PROGRESS NOTES
[facility]  Progress Note     Sera Adjutant Patient Status:  Inpatient    10/20/1985 MRN [id number]   [facility] 2SW-J Attending David Yen, 1604 [facility] Day # 5 PCP Samantha Whitley MD     Chief Complaint: HTN     S: Doing well [email or web address]   • Labetalol HCl  200 mg Oral Q8H [facility]   • Levothyroxine Sodium  100 mcg Oral Before breakfast   • prenatal multivitamin plus DHA  1 capsule Oral Once       ASSESSMENT / PLAN:     1. Post delivery HTN. Blood pressure improved.    1. Cont PO le

## 2019-05-10 NOTE — CM/SW NOTE
met with patient, Beth Russo and her spouse, Ronan Cruz. CM reviewed insurance and PCP for infant. Patient stated that they will add infant to [facility] that they delivered under.  CM reviewed with couple that they should check with employe

## 2019-05-11 VITALS
WEIGHT: 166 LBS | HEIGHT: 65 IN | HEART RATE: 72 BPM | BODY MASS INDEX: 27.66 KG/M2 | SYSTOLIC BLOOD PRESSURE: 130 MMHG | TEMPERATURE: 99 F | RESPIRATION RATE: 17 BRPM | OXYGEN SATURATION: 97 % | DIASTOLIC BLOOD PRESSURE: 89 MMHG

## 2019-05-11 RX ORDER — LABETALOL 200 MG/1
200 TABLET, FILM COATED ORAL EVERY 8 HOURS SCHEDULED
Qty: 63 TABLET | Refills: 2 | Status: SHIPPED | OUTPATIENT
Start: 2019-05-11 | End: 2019-12-13

## 2019-05-11 RX ORDER — HYDROCODONE BITARTRATE AND ACETAMINOPHEN 5; 325 MG/1; MG/1
1 TABLET ORAL EVERY 6 HOURS PRN
Qty: 15 TABLET | Refills: 0 | Status: SHIPPED | OUTPATIENT
Start: 2019-05-11 | End: 2019-07-01 | Stop reason: ALTCHOICE

## 2019-05-11 NOTE — TELEPHONE ENCOUNTER
Nj Velez,     Dr. Bozena Rodriguez sent this message to me, but not sure where it needs to be forwarded to. Can you assist me?

## 2019-05-11 NOTE — PLAN OF CARE
Problem: POSTPARTUM  Goal: Optimize infant feeding at the breast  Description  INTERVENTIONS:  - Initiate breast feeding within first hour after birth. - Monitor effectiveness of current breast feeding efforts.   - Assess support systems available to mo environmental stimuli, as able  - Instruct patient/family in relaxation techniques, as appropriate  - Assess for spiritual and psychosocial needs and initiate Spiritual Care or Behavioral Health consult as needed  Outcome: Completed     Problem: POSTPARTUM

## 2019-05-11 NOTE — DISCHARGE SUMMARY
[facility]  Discharge Summary    Roberta Gilford Patient Status:  Inpatient    10/20/1985 MRN [id number]   [facility] 2SW-J Attending Jory Kebede, [address] [facility] Day # 6 PCP Samantha Whitley MD         [facility]: Estimated Date of Delivery: 7

## 2019-05-11 NOTE — PROGRESS NOTES
Discharged to home, going to NICU first to spend time w/ baby, all belongings taken by patient. No questions re: discharge instructions/teaching.

## 2019-05-14 RX ORDER — LEVOTHYROXINE SODIUM 0.1 MG/1
100 TABLET ORAL
Qty: 30 TABLET | Refills: 3 | Status: SHIPPED | OUTPATIENT
Start: 2019-05-14 | End: 2019-07-01 | Stop reason: ALTCHOICE

## 2019-05-14 NOTE — TELEPHONE ENCOUNTER
Current Outpatient Medications:  Levothyroxine Sodium 100 MCG Oral Tab Take 1 tablet (100 mcg total) by mouth before breakfast. Disp: 30 tablet Rfl: 3     Refill

## 2019-05-15 ENCOUNTER — TELEPHONE (OUTPATIENT)
Dept: OBGYN CLINIC | Facility: CLINIC | Age: 34
End: 2019-05-15

## 2019-05-15 ENCOUNTER — TELEPHONE (OUTPATIENT)
Dept: OBGYN UNIT | Facility: HOSPITAL | Age: 34
End: 2019-05-15

## 2019-05-15 NOTE — PROGRESS NOTES
Reviewed self and infant care w / mom, she verbalizes understanding of instructions reviewed. Encourage to follow up w/ MDs as directed and w/ questions/concerns. All sx of PIH reviewed. Baby remains in nicu, and doing well, mom pumping.

## 2019-05-15 NOTE — TELEPHONE ENCOUNTER
Pt states she had emergency  on   At [facility] due to preclampsia. Pt state she was told to come in a week after for bp check and follow up. Pt states she has not been seen since . Ok to schedule appt? If so. ..with which physician?

## 2019-05-16 NOTE — TELEPHONE ENCOUNTER
This RN placed call to patient. Pt. Unavailable. LMTCB  Left detailed message advising pt that recommendation is that she is seen today. Dr. Karina Cazares has openings until 6. RN advises patient to c/b to schedule.       PHONE ROOM: If patient, calls back tod

## 2019-05-17 NOTE — TELEPHONE ENCOUNTER
This RN placed call to patient. [facility]      PHYLLIS. Guadalupe Riedel Guadalupe Riedel I have made several attempts to outreach patient. Will continue to outreach. Provider notified.

## 2019-05-18 ENCOUNTER — TELEPHONE (OUTPATIENT)
Dept: OBGYN CLINIC | Facility: CLINIC | Age: 34
End: 2019-05-18

## 2019-05-18 DIAGNOSIS — Z87.51 HISTORY OF PREMATURE DELIVERY: ICD-10-CM

## 2019-05-18 DIAGNOSIS — O92.70 LACTATION PROBLEM: Primary | ICD-10-CM

## 2019-05-18 RX ORDER — BREAST PUMP
EACH MISCELLANEOUS
Qty: 1 EACH | Refills: 0 | OUTPATIENT
Start: 2019-05-18

## 2019-05-18 NOTE — TELEPHONE ENCOUNTER
Per pt received breast pump yesterday, but had been using the rental hospital pump from the hospital. Was told by insurance they would also cover the cost for the rental if a referral was placed by physician.  [id number] Rolanda Cooper for referral?

## 2019-05-18 NOTE — TELEPHONE ENCOUNTER
Phone rang a few times and then went to busy signal. Pt called multiple times. Send certified letter?

## 2019-05-20 ENCOUNTER — OFFICE VISIT (OUTPATIENT)
Dept: OBGYN CLINIC | Facility: CLINIC | Age: 34
End: 2019-05-20

## 2019-05-20 ENCOUNTER — APPOINTMENT (OUTPATIENT)
Dept: LACTATION | Facility: HOSPITAL | Age: 34
End: 2019-05-20
Attending: OBSTETRICS & GYNECOLOGY
Payer: COMMERCIAL

## 2019-05-20 VITALS
SYSTOLIC BLOOD PRESSURE: 124 MMHG | DIASTOLIC BLOOD PRESSURE: 76 MMHG | BODY MASS INDEX: 23.81 KG/M2 | WEIGHT: 144.63 LBS | HEIGHT: 65.5 IN

## 2019-05-20 DIAGNOSIS — O13.9 PREGNANCY INDUCED HYPERTENSION, ANTEPARTUM: Primary | ICD-10-CM

## 2019-05-20 PROCEDURE — 99213 OFFICE O/P EST LOW 20 MIN: CPT | Performed by: OBSTETRICS & GYNECOLOGY

## 2019-05-20 NOTE — PROGRESS NOTES
HPI:    Patient ID: Jessica Forrester is a 26year old female. Patient here for blood pressure check. S/P  for PIH two weeks ago. Doing well. On Labetalol 200 mg TID. BP normal at 124/78. No C/O's.   Baby doing well at [facility] Neurological: She is alert and oriented to person, place, and time. Skin: Skin is warm and dry. Psychiatric: She has a normal mood and affect. Her behavior is normal. Judgment and thought content normal.   Nursing note and vitals reviewed.

## 2019-05-28 ENCOUNTER — TELEPHONE (OUTPATIENT)
Dept: ENDOCRINOLOGY CLINIC | Facility: CLINIC | Age: 34
End: 2019-05-28

## 2019-05-28 ENCOUNTER — OFFICE VISIT (OUTPATIENT)
Dept: ENDOCRINOLOGY CLINIC | Facility: CLINIC | Age: 34
End: 2019-05-28

## 2019-05-28 ENCOUNTER — APPOINTMENT (OUTPATIENT)
Dept: LAB | Facility: HOSPITAL | Age: 34
End: 2019-05-28
Attending: INTERNAL MEDICINE
Payer: COMMERCIAL

## 2019-05-28 VITALS
SYSTOLIC BLOOD PRESSURE: 113 MMHG | WEIGHT: 140.38 LBS | DIASTOLIC BLOOD PRESSURE: 74 MMHG | BODY MASS INDEX: 23 KG/M2 | HEART RATE: 78 BPM

## 2019-05-28 DIAGNOSIS — E03.9 HYPOTHYROIDISM, UNSPECIFIED TYPE: Primary | ICD-10-CM

## 2019-05-28 DIAGNOSIS — E03.9 HYPOTHYROID IN PREGNANCY, ANTEPARTUM: ICD-10-CM

## 2019-05-28 DIAGNOSIS — E03.9 HYPOTHYROIDISM, UNSPECIFIED TYPE: ICD-10-CM

## 2019-05-28 DIAGNOSIS — O99.280 HYPOTHYROID IN PREGNANCY, ANTEPARTUM: ICD-10-CM

## 2019-05-28 PROCEDURE — 99213 OFFICE O/P EST LOW 20 MIN: CPT | Performed by: INTERNAL MEDICINE

## 2019-05-28 PROCEDURE — 36415 COLL VENOUS BLD VENIPUNCTURE: CPT

## 2019-05-28 PROCEDURE — 84443 ASSAY THYROID STIM HORMONE: CPT

## 2019-05-28 PROCEDURE — 84439 ASSAY OF FREE THYROXINE: CPT

## 2019-05-28 NOTE — TELEPHONE ENCOUNTER
Reviewed thyroid labs  Indicate need for decrease in dose of LT 4  She is on LT 4 100 mcg daily, please confirm.    LT 4 88 mcg daily  TSH, Free T4 in 6 weeks  Thanks

## 2019-05-28 NOTE — PROGRESS NOTES
Return Office Visit     CHIEF COMPLAINT:    Post operative hypothyroidism     HISTORY OF PRESENT ILLNESS:  Niecy Ryan is a 26year old female who presents for follow of post operative hypothyroidism.      She underwent a right hemithyroidectomy for c SYSTEMS:  Constitutional: Negative for: weight change, fever, fatigue, cold/heat intolerance  Eyes: Negative for:  Visual changes, proptosis, blurring  ENT:   Cough, congestion   Respiratory: Negative for:  dyspnea, cough  Cardiovascular: Negative for:  ch

## 2019-05-29 NOTE — TELEPHONE ENCOUNTER
RN received return call from Joy in [facility] care who after speaking with patient's health plan [facility]) provides the following update:    1.) referral will be changed to authorized status  2.) pt to call plan at number on the back of her card and advise t

## 2019-05-29 NOTE — TELEPHONE ENCOUNTER
RN placed call to [facility], spoke with Ragini Jamil, who advises will follow up with HMO to determine what is needed to proceed with authorization. Hospital grade pump medically necessary due to extreme prematurity. Awaiting response/follow up.

## 2019-06-05 RX ORDER — LEVOTHYROXINE SODIUM 88 UG/1
88 TABLET ORAL
Qty: 30 TABLET | Refills: 2 | Status: SHIPPED | OUTPATIENT
Start: 2019-06-05 | End: 2020-11-07

## 2019-06-05 RX ORDER — LABETALOL 200 MG/1
200 TABLET, FILM COATED ORAL EVERY 8 HOURS SCHEDULED
Qty: 63 TABLET | Refills: 2 | OUTPATIENT
Start: 2019-06-05

## 2019-06-05 NOTE — TELEPHONE ENCOUNTER
Spoke with Lili. Confirmed she is compliant with current dose of LT4 100 mcg. She verbalizes understanding of lab results and is agreeable to decreasing to 88 mcg PO daily. She will plan to repeat labs in 6 weeks. Orders placed.

## 2019-06-17 ENCOUNTER — POSTPARTUM (OUTPATIENT)
Dept: OBGYN CLINIC | Facility: CLINIC | Age: 34
End: 2019-06-17

## 2019-06-17 VITALS — SYSTOLIC BLOOD PRESSURE: 110 MMHG | BODY MASS INDEX: 23 KG/M2 | DIASTOLIC BLOOD PRESSURE: 70 MMHG | WEIGHT: 141 LBS

## 2019-06-17 NOTE — PROGRESS NOTES
HPI:    Patient ID: Tonny Forrest is a 26year old female. Patient here for postpartum exam.  S/P LTCS at [facility] due to severe Pre-Eclampsia. Baby girl still in hospital.  Gaining weight and learning to suck and swallow.   Patient still on Abdominal: Soft. Genitourinary: Vagina normal and uterus normal.   Genitourinary Comments: Cervix:  No CMT. No lesions. Adnexa:  No adnexal masses. NT. Neurological: She is alert and oriented to person, place, and time.    Skin: Skin is warm and

## 2019-06-18 ENCOUNTER — TELEPHONE (OUTPATIENT)
Dept: OBGYN CLINIC | Facility: CLINIC | Age: 34
End: 2019-06-18

## 2019-06-18 NOTE — TELEPHONE ENCOUNTER
[id number] form for Dr. Hedy Snell received in Forms dept. Logged for processing. MOHINI packet emailed to [email or web address].  NK

## 2019-07-01 ENCOUNTER — OFFICE VISIT (OUTPATIENT)
Dept: FAMILY MEDICINE CLINIC | Facility: CLINIC | Age: 34
End: 2019-07-01

## 2019-07-01 VITALS
SYSTOLIC BLOOD PRESSURE: 115 MMHG | BODY MASS INDEX: 22.72 KG/M2 | HEIGHT: 65.5 IN | TEMPERATURE: 98 F | WEIGHT: 138 LBS | HEART RATE: 61 BPM | DIASTOLIC BLOOD PRESSURE: 69 MMHG

## 2019-07-01 DIAGNOSIS — R03.0 ELEVATED BLOOD PRESSURE, SITUATIONAL: Primary | ICD-10-CM

## 2019-07-01 PROCEDURE — 99212 OFFICE O/P EST SF 10 MIN: CPT | Performed by: FAMILY MEDICINE

## 2019-07-01 PROCEDURE — 99213 OFFICE O/P EST LOW 20 MIN: CPT | Performed by: FAMILY MEDICINE

## 2019-07-01 RX ORDER — LABETALOL 100 MG/1
100 TABLET, FILM COATED ORAL 2 TIMES DAILY
Qty: 60 TABLET | Refills: 0 | Status: SHIPPED | OUTPATIENT
Start: 2019-07-01 | End: 2019-12-13

## 2019-07-01 NOTE — PROGRESS NOTES
HPI:    Patient ID: Ferman Bernheim is a 26year old female. HPI  Patient presents with:  HTN: preeclampsia during pregnancy which induced into early labor in 5/7/19, first baby, dr wants pt to   consider if she can come off of BP medication.    Review LB#[id number]

## 2019-07-02 NOTE — TELEPHONE ENCOUNTER
Good afternoon Brielle Segura,  Form due 7/3/19  Please sign off on form:  -Highlight the patient and hit \"Chart\" button. -In Chart Review, w/in the Encounter tab - click 1 time on the Telephone call encounter for 6/18/19. Scroll down the telephone encounter.

## 2019-07-02 NOTE — TELEPHONE ENCOUNTER
Disability form for Dr. Tanisha Addison received in Forms dept+ FCR+ Signed release received. Logged for processing.  NK

## 2019-07-03 NOTE — TELEPHONE ENCOUNTER
Faxed FMLA/Disab to [facility] - [phone]. Mailed copy to pt. Notified via [employer].

## 2019-07-09 NOTE — TELEPHONE ENCOUNTER
RTCB to patient-     Pt states employer has not received fmla/dis form.    Informed pt via voicemail that form was faxed to [facility] on 7/3/19     Refaxed to [place] on 7/9/19 to [phone]

## 2019-12-13 ENCOUNTER — OFFICE VISIT (OUTPATIENT)
Dept: OBGYN CLINIC | Facility: CLINIC | Age: 34
End: 2019-12-13

## 2019-12-13 VITALS
BODY MASS INDEX: 19.77 KG/M2 | WEIGHT: 123 LBS | DIASTOLIC BLOOD PRESSURE: 80 MMHG | SYSTOLIC BLOOD PRESSURE: 122 MMHG | HEIGHT: 66 IN

## 2019-12-13 DIAGNOSIS — Z01.419 ENCOUNTER FOR GYNECOLOGICAL EXAMINATION WITHOUT ABNORMAL FINDING: Primary | ICD-10-CM

## 2019-12-13 PROCEDURE — 99395 PREV VISIT EST AGE 18-39: CPT | Performed by: OBSTETRICS & GYNECOLOGY

## 2019-12-21 ENCOUNTER — OFFICE VISIT (OUTPATIENT)
Dept: FAMILY MEDICINE CLINIC | Facility: CLINIC | Age: 34
End: 2019-12-21

## 2019-12-21 VITALS
TEMPERATURE: 98 F | BODY MASS INDEX: 20.25 KG/M2 | HEIGHT: 66 IN | HEART RATE: 73 BPM | DIASTOLIC BLOOD PRESSURE: 84 MMHG | WEIGHT: 126 LBS | SYSTOLIC BLOOD PRESSURE: 130 MMHG

## 2019-12-21 DIAGNOSIS — M77.8 TENDONITIS OF WRIST, LEFT: Primary | ICD-10-CM

## 2019-12-21 PROCEDURE — 99213 OFFICE O/P EST LOW 20 MIN: CPT | Performed by: FAMILY MEDICINE

## 2019-12-21 NOTE — PROGRESS NOTES
HPI:    Patient ID: Rama Eisenmenger is a 37year old female. HPI  Patient presents with:  Wrist Pain: left. started a month ago   cares for  baby  Review of Systems   Constitutional: Negative. Musculoskeletal: Positive for arthralgias.

## 2020-11-07 ENCOUNTER — OFFICE VISIT (OUTPATIENT)
Dept: FAMILY MEDICINE CLINIC | Facility: CLINIC | Age: 35
End: 2020-11-07

## 2020-11-07 VITALS
DIASTOLIC BLOOD PRESSURE: 76 MMHG | WEIGHT: 119 LBS | HEART RATE: 73 BPM | HEIGHT: 66 IN | SYSTOLIC BLOOD PRESSURE: 132 MMHG | BODY MASS INDEX: 19.13 KG/M2

## 2020-11-07 DIAGNOSIS — E89.0 HYPOTHYROIDISM, POSTOP: ICD-10-CM

## 2020-11-07 DIAGNOSIS — Z00.00 ROUTINE GENERAL MEDICAL EXAMINATION AT A HEALTH CARE FACILITY: ICD-10-CM

## 2020-11-07 DIAGNOSIS — M41.116 JUVENILE IDIOPATHIC SCOLIOSIS OF LUMBAR REGION: ICD-10-CM

## 2020-11-07 DIAGNOSIS — Z23 NEED FOR VACCINATION: Primary | ICD-10-CM

## 2020-11-07 PROBLEM — Z34.90 PREGNANCY: Status: RESOLVED | Noted: 2019-03-10 | Resolved: 2020-11-07

## 2020-11-07 PROBLEM — Z34.90 PREGNANCY (HCC): Status: RESOLVED | Noted: 2019-03-10 | Resolved: 2020-11-07

## 2020-11-07 PROCEDURE — 3075F SYST BP GE 130 - 139MM HG: CPT | Performed by: FAMILY MEDICINE

## 2020-11-07 PROCEDURE — 90686 IIV4 VACC NO PRSV 0.5 ML IM: CPT | Performed by: FAMILY MEDICINE

## 2020-11-07 PROCEDURE — 3078F DIAST BP <80 MM HG: CPT | Performed by: FAMILY MEDICINE

## 2020-11-07 PROCEDURE — 90471 IMMUNIZATION ADMIN: CPT | Performed by: FAMILY MEDICINE

## 2020-11-07 PROCEDURE — 99395 PREV VISIT EST AGE 18-39: CPT | Performed by: FAMILY MEDICINE

## 2020-11-07 PROCEDURE — 3008F BODY MASS INDEX DOCD: CPT | Performed by: FAMILY MEDICINE

## 2020-11-07 NOTE — PROGRESS NOTES
HPI:    Patient ID: Sera Adjutant is a 38year old female. HPI  Patient presents for complete physical exam.  History of hypothyroidism. History of juvenile idiopathic lumbar scoliosis with surgical correction.   Review of Systems   Constitutional: no cervical adenopathy. Neurological: She is alert and oriented to person, place, and time. She has normal strength and normal reflexes. No sensory deficit. Skin:   No suspicious lesions above the waist exam   Psychiatric: Her mood appears not anxious.

## 2020-12-07 NOTE — PROGRESS NOTES
Letter for missed labs sent to patient via "[employer] Statement Selected

## 2021-09-29 ENCOUNTER — TELEPHONE (OUTPATIENT)
Dept: FAMILY MEDICINE CLINIC | Facility: CLINIC | Age: 36
End: 2021-09-29

## 2021-09-29 ENCOUNTER — OFFICE VISIT (OUTPATIENT)
Dept: FAMILY MEDICINE CLINIC | Facility: CLINIC | Age: 36
End: 2021-09-29

## 2021-09-29 VITALS
BODY MASS INDEX: 18.64 KG/M2 | RESPIRATION RATE: 20 BRPM | WEIGHT: 116 LBS | TEMPERATURE: 100 F | HEIGHT: 66 IN | SYSTOLIC BLOOD PRESSURE: 149 MMHG | HEART RATE: 92 BPM | DIASTOLIC BLOOD PRESSURE: 88 MMHG

## 2021-09-29 DIAGNOSIS — R56.9 SEIZURE (HCC): ICD-10-CM

## 2021-09-29 DIAGNOSIS — J01.90 ACUTE SINUSITIS, RECURRENCE NOT SPECIFIED, UNSPECIFIED LOCATION: ICD-10-CM

## 2021-09-29 PROCEDURE — 3008F BODY MASS INDEX DOCD: CPT | Performed by: FAMILY MEDICINE

## 2021-09-29 PROCEDURE — 3077F SYST BP >= 140 MM HG: CPT | Performed by: FAMILY MEDICINE

## 2021-09-29 PROCEDURE — 99213 OFFICE O/P EST LOW 20 MIN: CPT | Performed by: FAMILY MEDICINE

## 2021-09-29 PROCEDURE — 3079F DIAST BP 80-89 MM HG: CPT | Performed by: FAMILY MEDICINE

## 2021-09-29 RX ORDER — AMOXICILLIN AND CLAVULANATE POTASSIUM 875; 125 MG/1; MG/1
1 TABLET, FILM COATED ORAL 2 TIMES DAILY
Qty: 20 TABLET | Refills: 0 | Status: SHIPPED | OUTPATIENT
Start: 2021-09-29 | End: 2021-11-16

## 2021-09-29 NOTE — PROGRESS NOTES
Subjective:   Patient ID: Hayden Bourgeois is a 38year old female. Pt presents with cold symptoms for 3-4 days. Pt has had sinus headache, cough, sore throat. Has had fevers. Pt has tried otc remedies without relief.  Pt states no sick contacts or COVI deficit present. Mental Status: She is disoriented. Sensory: No sensory deficit. Motor: No weakness.       Deep Tendon Reflexes: Reflexes normal.   Psychiatric:         Mood and Affect: Mood normal.         Behavior: Behavior normal.

## 2021-09-30 NOTE — TELEPHONE ENCOUNTER
On call paged by . He was concerned about what medications his wife can take for pain. She had take 2 excedrin evening before and morning and bottle no more than 4 in 24 hours.  Discussed ok to take 2 tylenol every 8 hours and alternate with ibuprofe

## 2021-09-30 NOTE — TELEPHONE ENCOUNTER
Message # [id number]         2021 08:31p   [[email or web address]]  To:  From:  XAVI Ricketts MD:  Phone#:  ----------------------------------------------------------------------  Marta Mejía  [phone]  RE LUZ MARINA LEWIS  [birth year]  RE  MIGRAINE  A

## 2021-10-04 ENCOUNTER — OFFICE VISIT (OUTPATIENT)
Dept: NEUROLOGY | Facility: CLINIC | Age: 36
End: 2021-10-04

## 2021-10-04 VITALS — HEIGHT: 66 IN | BODY MASS INDEX: 18.64 KG/M2 | WEIGHT: 116 LBS

## 2021-10-04 DIAGNOSIS — G43.109 MIGRAINE WITH AURA AND WITHOUT STATUS MIGRAINOSUS, NOT INTRACTABLE: ICD-10-CM

## 2021-10-04 DIAGNOSIS — R55 SYNCOPE, UNSPECIFIED SYNCOPE TYPE: Primary | ICD-10-CM

## 2021-10-04 PROCEDURE — 99204 OFFICE O/P NEW MOD 45 MIN: CPT | Performed by: OTHER

## 2021-10-04 PROCEDURE — 3008F BODY MASS INDEX DOCD: CPT | Performed by: OTHER

## 2021-10-04 RX ORDER — DIVALPROEX SODIUM 250 MG/1
TABLET, EXTENDED RELEASE ORAL
Qty: 90 TABLET | Refills: 3 | Status: SHIPPED | OUTPATIENT
Start: 2021-10-04 | End: 2021-11-16

## 2021-10-04 NOTE — PROGRESS NOTES
Neurology Initial Visit     Referred By: Dr. Perez ref. provider found    Chief Complaint: Patient presents with:  Neurologic Problem: Patient presents today with .  She states that last Sunday she started to have severe headache and on tuesday it see tibia   • Essential hypertension     during pregnancy, induced labor    • History of blood transfusion     when pt was 13 and 17 for back and leg surgeries   • Hypertension 1/15/2019   • Hypothyroidism     hypothyroid dx'd 2016   • Pregnancy-induced hypert concentration  Thought process intact  Memory intact- recent and remote  Mood intact  Fund of knowledge appropriate for education and age    Language intact including: comprehension, naming, repetition, vocabulary    Cranial Nerves:  II.- Visual fields ful seizure versus convulsive syncope. This is a first-time event, therefore at this point would not suggest any epileptic medication though we might start Depakote for control of the headaches. EEG and MRI of the brain will be done.   It is possible syncopal

## 2021-10-08 ENCOUNTER — HOSPITAL ENCOUNTER (OUTPATIENT)
Dept: ELECTROPHYSIOLOGY | Facility: HOSPITAL | Age: 36
Discharge: HOME | End: 2021-10-08
Attending: Other
Payer: COMMERCIAL

## 2021-10-08 PROCEDURE — 95816 EEG AWAKE AND DROWSY: CPT | Performed by: OTHER

## 2021-10-09 NOTE — PROCEDURES
EEG report    REFERRING PHYSICIAN: Elma Cook MD    PCP and phone number:  Queen Jesu Whitley MD  [phone]    TECHNIQUE: 21 channels of EEG, 2 channels of EOG, and 1 channel of EKG were recorded utilizing the International 10/20 System.  The re

## 2021-10-11 ENCOUNTER — TELEPHONE (OUTPATIENT)
Dept: NEUROLOGY | Facility: CLINIC | Age: 36
End: 2021-10-11

## 2021-10-11 NOTE — TELEPHONE ENCOUNTER
----- Message from Natacha Workman MD sent at 10/9/2021  9:40 AM CDT -----  Please let patient know that EEG was normal.

## 2021-10-18 ENCOUNTER — HOSPITAL ENCOUNTER (OUTPATIENT)
Dept: MRI IMAGING | Age: 36
Discharge: HOME | End: 2021-10-18
Attending: Other
Payer: COMMERCIAL

## 2021-10-18 DIAGNOSIS — R55 SYNCOPE, UNSPECIFIED SYNCOPE TYPE: ICD-10-CM

## 2021-10-18 PROCEDURE — A9575 INJ GADOTERATE MEGLUMI 0.1ML: HCPCS | Performed by: OTHER

## 2021-10-18 PROCEDURE — 70553 MRI BRAIN STEM W/O & W/DYE: CPT | Performed by: OTHER

## 2021-10-19 ENCOUNTER — TELEPHONE (OUTPATIENT)
Dept: NEUROLOGY | Facility: CLINIC | Age: 36
End: 2021-10-19

## 2021-10-19 NOTE — TELEPHONE ENCOUNTER
Spoke to patient and notified her of below. She was understanding. Transferred her to  to schedule a follow-up appointment.

## 2021-10-19 NOTE — TELEPHONE ENCOUNTER
----- Message from Irais Church MD sent at 10/19/2021  9:03 AM CDT -----  Please let patient know that MRI brain didn't show significant abnormalities.   Some sinusitis is noted, that would not play a role in her syncope

## 2021-11-16 ENCOUNTER — OFFICE VISIT (OUTPATIENT)
Dept: NEUROLOGY | Facility: CLINIC | Age: 36
End: 2021-11-16

## 2021-11-16 VITALS
HEIGHT: 66 IN | WEIGHT: 116 LBS | SYSTOLIC BLOOD PRESSURE: 132 MMHG | BODY MASS INDEX: 18.64 KG/M2 | DIASTOLIC BLOOD PRESSURE: 82 MMHG

## 2021-11-16 DIAGNOSIS — G43.109 MIGRAINE WITH AURA AND WITHOUT STATUS MIGRAINOSUS, NOT INTRACTABLE: ICD-10-CM

## 2021-11-16 DIAGNOSIS — R55 SYNCOPE, UNSPECIFIED SYNCOPE TYPE: Primary | ICD-10-CM

## 2021-11-16 PROCEDURE — 3008F BODY MASS INDEX DOCD: CPT | Performed by: OTHER

## 2021-11-16 PROCEDURE — 3075F SYST BP GE 130 - 139MM HG: CPT | Performed by: OTHER

## 2021-11-16 PROCEDURE — 3079F DIAST BP 80-89 MM HG: CPT | Performed by: OTHER

## 2021-11-16 PROCEDURE — 99214 OFFICE O/P EST MOD 30 MIN: CPT | Performed by: OTHER

## 2021-11-16 NOTE — PROGRESS NOTES
Neurology follow-up visit     Referred By: Dr. Perez ref. provider found    Chief Complaint: Patient presents with:  Syncope: LOV: 10/4/21. F/U on syncope episode. She denies any more seizure/syncope episodes. She had testing done; MRI of brain, EEG.   She surgeries   • Hypertension 1/15/2019   • Hypothyroidism     hypothyroid dx'd 2016   • Pregnancy-induced hypertension    • Scoliosis    • Scoliosis, adolescent, acquired 12/04/2017   • Transfusion history     4 during cancer treatment & scoliosis surgery whisper. IX. Pallet elevates symmetrically. XI. Shoulder shrug is intact  XII.  Tongue is midline    Motor Exam:  Muscle tone normal  No atrophy or fasciculations  Strength- upper extremities 5/5 proximally and distally                  Rapid alternating information given. All questions were answered. All side effects of drugs were discussed. Return to clinic in: No follow-ups on file.     Natacha Workman MD

## 2022-06-08 ENCOUNTER — NURSE TRIAGE (OUTPATIENT)
Dept: FAMILY MEDICINE CLINIC | Facility: CLINIC | Age: 37
End: 2022-06-08

## 2022-06-08 ENCOUNTER — TELEMEDICINE (OUTPATIENT)
Dept: FAMILY MEDICINE CLINIC | Facility: CLINIC | Age: 37
End: 2022-06-08
Payer: COMMERCIAL

## 2022-06-08 DIAGNOSIS — G43.009 MIGRAINE WITHOUT AURA AND WITHOUT STATUS MIGRAINOSUS, NOT INTRACTABLE: Primary | ICD-10-CM

## 2022-06-08 PROCEDURE — 99213 OFFICE O/P EST LOW 20 MIN: CPT | Performed by: PHYSICIAN ASSISTANT

## 2022-06-08 RX ORDER — SUMATRIPTAN 25 MG/1
25 TABLET, FILM COATED ORAL EVERY 2 HOUR PRN
Qty: 9 TABLET | Refills: 3 | Status: SHIPPED | OUTPATIENT
Start: 2022-06-08

## 2022-06-08 RX ORDER — IBUPROFEN 600 MG/1
600 TABLET ORAL EVERY 6 HOURS PRN
Qty: 60 TABLET | Refills: 0 | Status: SHIPPED | OUTPATIENT
Start: 2022-06-08 | End: 2022-07-08

## 2022-06-08 NOTE — TELEPHONE ENCOUNTER
Pt stated that her s/s started  Yesterday afternoon. Pt has a low grade fever 100.0,chills headache 7/10  and feels nausea and weak. Pt  denied having chest pain, sob. Pt has not tested for covid. Pt was informed we can give her a video visit or she can go to I/C. Pt stated that she will like a video visit for today.      Future Appointments   Date Time Provider Belinda De La Fuente   6/8/2022  1:45 PM Mirna Rahman PA-C [facility] [place]

## 2022-07-07 ENCOUNTER — LAB ENCOUNTER (OUTPATIENT)
Dept: LAB | Age: 37
End: 2022-07-07
Attending: FAMILY MEDICINE
Payer: COMMERCIAL

## 2022-07-07 ENCOUNTER — OFFICE VISIT (OUTPATIENT)
Dept: FAMILY MEDICINE CLINIC | Facility: CLINIC | Age: 37
End: 2022-07-07
Payer: COMMERCIAL

## 2022-07-07 VITALS
SYSTOLIC BLOOD PRESSURE: 142 MMHG | HEART RATE: 51 BPM | BODY MASS INDEX: 19.29 KG/M2 | DIASTOLIC BLOOD PRESSURE: 86 MMHG | HEIGHT: 66 IN | WEIGHT: 120 LBS

## 2022-07-07 DIAGNOSIS — Z00.00 ROUTINE GENERAL MEDICAL EXAMINATION AT A HEALTH CARE FACILITY: Primary | ICD-10-CM

## 2022-07-07 DIAGNOSIS — E89.0 HYPOTHYROIDISM, POSTOP: ICD-10-CM

## 2022-07-07 DIAGNOSIS — Z00.00 ROUTINE GENERAL MEDICAL EXAMINATION AT A HEALTH CARE FACILITY: ICD-10-CM

## 2022-07-07 DIAGNOSIS — M41.116 JUVENILE IDIOPATHIC SCOLIOSIS OF LUMBAR REGION: ICD-10-CM

## 2022-07-07 PROBLEM — Z85.830 HISTORY OF SARCOMA OF BONE: Status: ACTIVE | Noted: 2022-07-07

## 2022-07-07 LAB
ALBUMIN SERPL-MCNC: 4 G/DL (ref 3.4–5)
ALBUMIN/GLOB SERPL: 1.2 {RATIO} (ref 1–2)
ALP LIVER SERPL-CCNC: 54 U/L
ALT SERPL-CCNC: 18 U/L
ANION GAP SERPL CALC-SCNC: 5 MMOL/L (ref 0–18)
AST SERPL-CCNC: 17 U/L (ref 15–37)
BASOPHILS # BLD AUTO: 0.06 X10(3) UL (ref 0–0.2)
BASOPHILS NFR BLD AUTO: 1.2 %
BILIRUB SERPL-MCNC: 1.1 MG/DL (ref 0.1–2)
BUN BLD-MCNC: 12 MG/DL (ref 7–18)
BUN/CREAT SERPL: 14.6 (ref 10–20)
CALCIUM BLD-MCNC: 8.8 MG/DL (ref 8.5–10.1)
CHLORIDE SERPL-SCNC: 106 MMOL/L (ref 98–112)
CHOLEST SERPL-MCNC: 169 MG/DL (ref ?–200)
CO2 SERPL-SCNC: 29 MMOL/L (ref 21–32)
CREAT BLD-MCNC: 0.82 MG/DL
DEPRECATED RDW RBC AUTO: 45.7 FL (ref 35.1–46.3)
EOSINOPHIL # BLD AUTO: 0.12 X10(3) UL (ref 0–0.7)
EOSINOPHIL NFR BLD AUTO: 2.5 %
ERYTHROCYTE [DISTWIDTH] IN BLOOD BY AUTOMATED COUNT: 12.8 % (ref 11–15)
FASTING PATIENT LIPID ANSWER: NO
FASTING STATUS PATIENT QL REPORTED: NO
GLOBULIN PLAS-MCNC: 3.3 G/DL (ref 2.8–4.4)
GLUCOSE BLD-MCNC: 81 MG/DL (ref 70–99)
HCT VFR BLD AUTO: 41.8 %
HDLC SERPL-MCNC: 75 MG/DL (ref 40–59)
HGB BLD-MCNC: 13.5 G/DL
IMM GRANULOCYTES # BLD AUTO: 0.01 X10(3) UL (ref 0–1)
IMM GRANULOCYTES NFR BLD: 0.2 %
LDLC SERPL CALC-MCNC: 84 MG/DL (ref ?–100)
LYMPHOCYTES # BLD AUTO: 1.67 X10(3) UL (ref 1–4)
LYMPHOCYTES NFR BLD AUTO: 34.4 %
MCH RBC QN AUTO: 31.3 PG (ref 26–34)
MCHC RBC AUTO-ENTMCNC: 32.3 G/DL (ref 31–37)
MCV RBC AUTO: 96.8 FL
MONOCYTES # BLD AUTO: 0.36 X10(3) UL (ref 0.1–1)
MONOCYTES NFR BLD AUTO: 7.4 %
NEUTROPHILS # BLD AUTO: 2.64 X10 (3) UL (ref 1.5–7.7)
NEUTROPHILS # BLD AUTO: 2.64 X10(3) UL (ref 1.5–7.7)
NEUTROPHILS NFR BLD AUTO: 54.3 %
NONHDLC SERPL-MCNC: 94 MG/DL (ref ?–130)
OSMOLALITY SERPL CALC.SUM OF ELEC: 289 MOSM/KG (ref 275–295)
PLATELET # BLD AUTO: 256 10(3)UL (ref 150–450)
POTASSIUM SERPL-SCNC: 3.4 MMOL/L (ref 3.5–5.1)
PROT SERPL-MCNC: 7.3 G/DL (ref 6.4–8.2)
RBC # BLD AUTO: 4.32 X10(6)UL
SODIUM SERPL-SCNC: 140 MMOL/L (ref 136–145)
T3 SERPL-MCNC: 89 NG/DL (ref 60–181)
T4 FREE SERPL-MCNC: 0.8 NG/DL (ref 0.8–1.7)
TRIGL SERPL-MCNC: 48 MG/DL (ref 30–149)
TSI SER-ACNC: 5.94 MIU/ML (ref 0.36–3.74)
VLDLC SERPL CALC-MCNC: 8 MG/DL (ref 0–30)
WBC # BLD AUTO: 4.9 X10(3) UL (ref 4–11)

## 2022-07-07 PROCEDURE — 3008F BODY MASS INDEX DOCD: CPT | Performed by: FAMILY MEDICINE

## 2022-07-07 PROCEDURE — 99395 PREV VISIT EST AGE 18-39: CPT | Performed by: FAMILY MEDICINE

## 2022-07-07 PROCEDURE — 3077F SYST BP >= 140 MM HG: CPT | Performed by: FAMILY MEDICINE

## 2022-07-07 PROCEDURE — 85025 COMPLETE CBC W/AUTO DIFF WBC: CPT

## 2022-07-07 PROCEDURE — 80053 COMPREHEN METABOLIC PANEL: CPT

## 2022-07-07 PROCEDURE — 3079F DIAST BP 80-89 MM HG: CPT | Performed by: FAMILY MEDICINE

## 2022-07-07 PROCEDURE — 36415 COLL VENOUS BLD VENIPUNCTURE: CPT

## 2022-07-07 PROCEDURE — 84480 ASSAY TRIIODOTHYRONINE (T3): CPT

## 2022-07-07 PROCEDURE — 84443 ASSAY THYROID STIM HORMONE: CPT

## 2022-07-07 PROCEDURE — 80061 LIPID PANEL: CPT

## 2022-07-07 PROCEDURE — 84439 ASSAY OF FREE THYROXINE: CPT

## 2022-07-09 DIAGNOSIS — E89.0 HYPOTHYROIDISM, POSTOP: Primary | ICD-10-CM

## 2022-07-09 RX ORDER — LEVOTHYROXINE SODIUM 0.07 MG/1
75 TABLET ORAL
Qty: 30 TABLET | Refills: 2 | Status: SHIPPED | OUTPATIENT
Start: 2022-07-09

## 2023-01-30 NOTE — PROGRESS NOTES
HPI:    Patient ID: Irma Hernandez is a 27year old female.     HPI  Patient presents with:  Leg Pain: pt thinks she might need PT for leg, she had surgery in leg  years ago  Urinary: pt c/o of four smelling urine    Review of Systems   Constitutional: N LYMPH NODE ENLARGEMENT

## 2023-03-10 NOTE — PROGRESS NOTES
HPI:    Patient ID: Frederick Gutierrez is a 37year old female. Patient here for routine exam.  Baby girl is 7 months now. Patient is S/P LTCS at [facility] for Severe Pre-Eclampsia. BP normal today and on no medication.   Thinking about Paragard I She has multiple reasons for shortness of breath we again discussed the importance of quitting smoking as she does have underlying COPD she has not been using her inhaler consistently and we discussed the importance of that.  Were also going to restart her fluid pills as the excess swelling may be affecting some of her breathing and the fact that she does not use her CPAP at night may be contributing to this.  Going to get some screening blood work and a chest x-ray as well and we will follow-up with her in 4 weeks.   Adnexa:  No adnexal masses. NT. Musculoskeletal: Normal range of motion. Lymphadenopathy:     She has no cervical adenopathy. Neurological: She is alert and oriented to person, place, and time. Skin: Skin is warm and dry.    Psychiatric: She ha

## 2023-05-26 NOTE — PROGRESS NOTES
Reason for Consult:   Dear Dr. Maisha Garvin,    Thank you for requesting maternal fetal medicine consultation on [address]. As you are aware she is a 26year old female with a Vang pregnancy at 23w1d.   A maternal-fetal medicine consultation w Grandmother    • Heart Disease Maternal Grandfather    • Cancer Paternal Grandmother    • Heart Disease Maternal Uncle       Social History    Tobacco Use      Smoking status: Never Smoker      Smokeless tobacco: Never Used    Alcohol use: Yes      Comment Purse String (Intermediate) Text: Given the location of the defect and the characteristics of the surrounding skin a purse string intermediate closure was deemed most appropriate.  Undermining was performed circumfirentially around the surgical defect.  A purse string suture was then placed and tightened.

## 2024-04-06 ENCOUNTER — OFFICE VISIT (OUTPATIENT)
Dept: FAMILY MEDICINE CLINIC | Facility: CLINIC | Age: 39
End: 2024-04-06

## 2024-04-06 VITALS
HEIGHT: 66 IN | SYSTOLIC BLOOD PRESSURE: 144 MMHG | WEIGHT: 124.63 LBS | BODY MASS INDEX: 20.03 KG/M2 | DIASTOLIC BLOOD PRESSURE: 82 MMHG | HEART RATE: 55 BPM

## 2024-04-06 DIAGNOSIS — E03.8 HYPOTHYROIDISM DUE TO HASHIMOTO'S THYROIDITIS: ICD-10-CM

## 2024-04-06 DIAGNOSIS — Z00.00 ROUTINE GENERAL MEDICAL EXAMINATION AT A HEALTH CARE FACILITY: Primary | ICD-10-CM

## 2024-04-06 DIAGNOSIS — E06.3 HYPOTHYROIDISM DUE TO HASHIMOTO'S THYROIDITIS: ICD-10-CM

## 2024-04-06 DIAGNOSIS — E89.0 HYPOTHYROIDISM, POSTOP: ICD-10-CM

## 2024-04-06 PROBLEM — I10 HYPERTENSION: Status: RESOLVED | Noted: 2019-01-15 | Resolved: 2024-04-06

## 2024-04-06 PROBLEM — Z87.39 PERSONAL HISTORY OF SCOLIOSIS: Status: RESOLVED | Noted: 2019-01-15 | Resolved: 2024-04-06

## 2024-04-06 PROBLEM — O13.9 PIH (PREGNANCY INDUCED HYPERTENSION), ANTEPARTUM (HCC): Status: RESOLVED | Noted: 2019-05-03 | Resolved: 2024-04-06

## 2024-04-06 PROBLEM — O13.9 PIH (PREGNANCY INDUCED HYPERTENSION) (HCC): Status: RESOLVED | Noted: 2019-05-02 | Resolved: 2024-04-06

## 2024-04-06 PROBLEM — O09.519 ADVANCED MATERNAL AGE, 1ST PREGNANCY (HCC): Status: RESOLVED | Noted: 2018-11-14 | Resolved: 2024-04-06

## 2024-04-06 NOTE — PROGRESS NOTES
Subjective:   Lili Claire is a 38 year old female who presents for Physical and Thyroid Problem (Has not taken rx in a year )       History/Other:    Chief Complaint Reviewed and Verified  Nursing Notes Reviewed and   Verified  Tobacco Reviewed  Allergies Reviewed  Medications Reviewed    Problem List Reviewed  Medical History Reviewed  Surgical History   Reviewed  OB Status Reviewed  Family History Reviewed  Social History   Reviewed         Tobacco:  She has never smoked tobacco.    Current Outpatient Medications   Medication Sig Dispense Refill    Multiple Vitamins-Minerals (MULTIVITAL OR) Take by mouth.      levothyroxine 75 MCG Oral Tab Take 1 tablet (75 mcg total) by mouth before breakfast. (Patient not taking: Reported on 4/6/2024) 30 tablet 2         Review of Systems:  Review of Systems   Constitutional: Negative.    HENT: Negative.     Eyes: Negative.    Respiratory: Negative.     Cardiovascular: Negative.    Gastrointestinal: Negative.    Genitourinary: Negative.    Musculoskeletal:  Positive for arthralgias.        Left lower leg pain secondary to surgical hardware   Skin: Negative.    Neurological: Negative.    Psychiatric/Behavioral: Negative.  Negative for sleep disturbance and suicidal ideas. The patient is not nervous/anxious.          Objective:   /82   Pulse 55   Ht 5' 6\" (1.676 m)   Wt 124 lb 9.6 oz (56.5 kg)   LMP 03/01/2024 (Within Days)   BMI 20.11 kg/m²  Estimated body mass index is 20.11 kg/m² as calculated from the following:    Height as of this encounter: 5' 6\" (1.676 m).    Weight as of this encounter: 124 lb 9.6 oz (56.5 kg).  Physical Exam  Vitals reviewed.   Constitutional:       Appearance: She is well-developed.   HENT:      Head: Normocephalic.      Right Ear: Hearing, tympanic membrane, ear canal and external ear normal.      Left Ear: Hearing, tympanic membrane, ear canal and external ear normal.      Nose: Nose normal.   Eyes:      General: Lids are  normal.      Conjunctiva/sclera: Conjunctivae normal.      Pupils: Pupils are equal, round, and reactive to light.      Funduscopic exam:     Right eye: No hemorrhage or papilledema.         Left eye: No hemorrhage or papilledema.   Neck:      Thyroid: No thyroid mass or thyromegaly.      Comments: Surgical scar thyroid  Cardiovascular:      Pulses:           Radial pulses are 2+ on the right side and 2+ on the left side.      Heart sounds: Normal heart sounds.   Pulmonary:      Effort: Pulmonary effort is normal.      Breath sounds: Normal breath sounds.   Abdominal:      Palpations: There is no hepatomegaly or splenomegaly.      Tenderness: There is no abdominal tenderness.   Musculoskeletal:      Cervical back: Neck supple.      Thoracic back: Scoliosis present.      Lumbar back: Scoliosis present.   Lymphadenopathy:      Cervical: No cervical adenopathy.   Skin:     Comments: No suspicious lesions above the waist exam   Neurological:      Mental Status: She is alert and oriented to person, place, and time.      Sensory: No sensory deficit.      Deep Tendon Reflexes: Reflexes are normal and symmetric.   Psychiatric:         Mood and Affect: Mood is not anxious or depressed.           Assessment & Plan:   1. Routine general medical examination at a health care facility (Primary)  -     CBC With Differential With Platelet; Future; Expected date: 04/06/2024  -     Comp Metabolic Panel (14); Future; Expected date: 04/06/2024  -     Lipid Panel; Future; Expected date: 04/06/2024  -     TSH and Free T4; Future; Expected date: 04/06/2024  2. Hypothyroidism, postop  3. Hypothyroidism due to Hashimoto's thyroiditis  -     TSH and Free T4; Future; Expected date: 04/06/2024    Borderline BP in this patient at optimal weight and exercises four days a week.  She has not taken her thyroid replacement medication for one year.  Check thyroid first and if med needed can start and follow up to see if affects her BP.  She is  resistant to go on BP medication.       No follow-ups on file.    Sudeep Olosn DO, 4/6/2024, 11:19 AM

## 2024-05-15 NOTE — PROGRESS NOTES
Here today for b/p check follow up.  Has been occasionally checking b/ps' at home: 130s'/140's-90's  122/75 -1/2  141/90- 1/1  146/93- 12/31  131/91- 12/26 (here)  Last week had episode of blurred vision while driving, has occasional headaches maybe once ev follows with neurology

## 2024-10-25 ENCOUNTER — NURSE TRIAGE (OUTPATIENT)
Dept: FAMILY MEDICINE CLINIC | Facility: CLINIC | Age: 39
End: 2024-10-25

## 2024-10-25 NOTE — TELEPHONE ENCOUNTER
Reason for Disposition   Blurred vision or visual changes and present now and sudden onset or new (e.g., minutes, hours, days)  (Exception: Seeing floaters / black specks OR previously diagnosed migraine headaches with same symptoms.)    Protocols used: Vision Loss or Change-A-OH      Action Requested: Summary for Provider     []  Critical Lab, Recommendations Needed  [] Need Additional Advice  []   FYI    []   Need Orders  [] Need Medications Sent to Pharmacy  []  Other     SUMMARY:  ER for eval vision loss left eye.    Patient states has new vision loss in left eye, \"I don't have peripheral vision at all\"    Advised ER per protocol and patient agrees to plan.     Reason for call: No chief complaint on file.  Onset: Data Unavailable

## 2024-10-26 ENCOUNTER — TELEPHONE (OUTPATIENT)
Dept: FAMILY MEDICINE CLINIC | Facility: CLINIC | Age: 39
End: 2024-10-26

## 2024-10-26 DIAGNOSIS — H33.20 RETINAL DETACHMENT, UNSPECIFIED LATERALITY: ICD-10-CM

## 2024-10-26 DIAGNOSIS — H53.462 LEFT HOMONYMOUS INFERIOR QUADRANTANOPIA: Primary | ICD-10-CM

## 2024-10-26 NOTE — TELEPHONE ENCOUNTER
Patient  called and states wife had to go to er due to vision problems. Saw another doctor and that doctor states it was a retinal tare. Patient  is requesting a retinal surgeon referral in network.

## 2024-10-28 ENCOUNTER — ANESTHESIA EVENT (OUTPATIENT)
Dept: SURGERY | Facility: HOSPITAL | Age: 39
End: 2024-10-28
Payer: COMMERCIAL

## 2024-10-28 ENCOUNTER — ANESTHESIA (OUTPATIENT)
Dept: SURGERY | Facility: HOSPITAL | Age: 39
End: 2024-10-28
Payer: COMMERCIAL

## 2024-10-28 ENCOUNTER — HOSPITAL ENCOUNTER (OUTPATIENT)
Facility: HOSPITAL | Age: 39
Setting detail: HOSPITAL OUTPATIENT SURGERY
Discharge: HOME | End: 2024-10-28
Attending: OPHTHALMOLOGY | Admitting: OPHTHALMOLOGY
Payer: COMMERCIAL

## 2024-10-28 VITALS
HEIGHT: 66 IN | BODY MASS INDEX: 20.25 KG/M2 | TEMPERATURE: 99 F | OXYGEN SATURATION: 98 % | DIASTOLIC BLOOD PRESSURE: 87 MMHG | SYSTOLIC BLOOD PRESSURE: 147 MMHG | WEIGHT: 126 LBS | RESPIRATION RATE: 16 BRPM | HEART RATE: 60 BPM

## 2024-10-28 LAB — B-HCG UR QL: NEGATIVE

## 2024-10-28 PROCEDURE — 085F3ZZ DESTRUCTION OF LEFT RETINA, PERCUTANEOUS APPROACH: ICD-10-PCS | Performed by: OPHTHALMOLOGY

## 2024-10-28 PROCEDURE — 81025 URINE PREGNANCY TEST: CPT

## 2024-10-28 PROCEDURE — 08U13JZ SUPPLEMENT OF LEFT EYE WITH SYNTHETIC SUBSTITUTE, PERCUTANEOUS APPROACH: ICD-10-PCS | Performed by: OPHTHALMOLOGY

## 2024-10-28 DEVICE — SPONGE SCLER L5XW2.5MM HALF RND STYL 510 SIL: Type: IMPLANTABLE DEVICE | Site: EYE | Status: FUNCTIONAL

## 2024-10-28 RX ORDER — LIDOCAINE HYDROCHLORIDE 10 MG/ML
INJECTION, SOLUTION EPIDURAL; INFILTRATION; INTRACAUDAL; PERINEURAL AS NEEDED
Status: DISCONTINUED | OUTPATIENT
Start: 2024-10-28 | End: 2024-10-28 | Stop reason: SURG

## 2024-10-28 RX ORDER — SODIUM CHLORIDE, SODIUM LACTATE, POTASSIUM CHLORIDE, CALCIUM CHLORIDE 600; 310; 30; 20 MG/100ML; MG/100ML; MG/100ML; MG/100ML
INJECTION, SOLUTION INTRAVENOUS CONTINUOUS
Status: DISCONTINUED | OUTPATIENT
Start: 2024-10-28 | End: 2024-10-29

## 2024-10-28 RX ORDER — ACETAMINOPHEN 500 MG
1000 TABLET ORAL ONCE
Status: COMPLETED | OUTPATIENT
Start: 2024-10-28 | End: 2024-10-28

## 2024-10-28 RX ORDER — HYDROCODONE BITARTRATE AND ACETAMINOPHEN 10; 325 MG/1; MG/1
1 TABLET ORAL ONCE
Status: COMPLETED | OUTPATIENT
Start: 2024-10-28 | End: 2024-10-28

## 2024-10-28 RX ORDER — LABETALOL HYDROCHLORIDE 5 MG/ML
INJECTION, SOLUTION INTRAVENOUS AS NEEDED
Status: DISCONTINUED | OUTPATIENT
Start: 2024-10-28 | End: 2024-10-28 | Stop reason: SURG

## 2024-10-28 RX ORDER — MIDAZOLAM HYDROCHLORIDE 1 MG/ML
INJECTION INTRAMUSCULAR; INTRAVENOUS AS NEEDED
Status: DISCONTINUED | OUTPATIENT
Start: 2024-10-28 | End: 2024-10-28 | Stop reason: SURG

## 2024-10-28 RX ORDER — ONDANSETRON 2 MG/ML
INJECTION INTRAMUSCULAR; INTRAVENOUS AS NEEDED
Status: DISCONTINUED | OUTPATIENT
Start: 2024-10-28 | End: 2024-10-28 | Stop reason: SURG

## 2024-10-28 RX ORDER — NALOXONE HYDROCHLORIDE 0.4 MG/ML
0.08 INJECTION, SOLUTION INTRAMUSCULAR; INTRAVENOUS; SUBCUTANEOUS AS NEEDED
Status: DISCONTINUED | OUTPATIENT
Start: 2024-10-28 | End: 2024-10-29

## 2024-10-28 RX ORDER — HYDROMORPHONE HYDROCHLORIDE 1 MG/ML
0.2 INJECTION, SOLUTION INTRAMUSCULAR; INTRAVENOUS; SUBCUTANEOUS EVERY 5 MIN PRN
Status: DISCONTINUED | OUTPATIENT
Start: 2024-10-28 | End: 2024-10-29

## 2024-10-28 RX ORDER — MORPHINE SULFATE 10 MG/ML
6 INJECTION, SOLUTION INTRAMUSCULAR; INTRAVENOUS EVERY 10 MIN PRN
Status: DISCONTINUED | OUTPATIENT
Start: 2024-10-28 | End: 2024-10-29

## 2024-10-28 RX ORDER — HYDROMORPHONE HYDROCHLORIDE 1 MG/ML
0.6 INJECTION, SOLUTION INTRAMUSCULAR; INTRAVENOUS; SUBCUTANEOUS EVERY 5 MIN PRN
Status: DISCONTINUED | OUTPATIENT
Start: 2024-10-28 | End: 2024-10-29

## 2024-10-28 RX ORDER — DEXAMETHASONE SODIUM PHOSPHATE 4 MG/ML
VIAL (ML) INJECTION AS NEEDED
Status: DISCONTINUED | OUTPATIENT
Start: 2024-10-28 | End: 2024-10-28 | Stop reason: SURG

## 2024-10-28 RX ORDER — PHENYLEPHRINE HYDROCHLORIDE 25 MG/ML
2 SOLUTION/ DROPS OPHTHALMIC AS NEEDED
Status: DISCONTINUED | OUTPATIENT
Start: 2024-10-28 | End: 2024-10-28 | Stop reason: HOSPADM

## 2024-10-28 RX ORDER — ACETAZOLAMIDE 250 MG/1
500 TABLET ORAL ONCE
Status: COMPLETED | OUTPATIENT
Start: 2024-10-28 | End: 2024-10-28

## 2024-10-28 RX ORDER — HYDROCODONE BITARTRATE AND ACETAMINOPHEN 5; 325 MG/1; MG/1
1 TABLET ORAL ONCE
Status: DISCONTINUED | OUTPATIENT
Start: 2024-10-28 | End: 2024-10-28

## 2024-10-28 RX ORDER — BALANCED SALT SOLUTION 6.4; .75; .48; .3; 3.9; 1.7 MG/ML; MG/ML; MG/ML; MG/ML; MG/ML; MG/ML
SOLUTION OPHTHALMIC AS NEEDED
Status: DISCONTINUED | OUTPATIENT
Start: 2024-10-28 | End: 2024-10-28 | Stop reason: HOSPADM

## 2024-10-28 RX ORDER — MORPHINE SULFATE 4 MG/ML
4 INJECTION, SOLUTION INTRAMUSCULAR; INTRAVENOUS EVERY 10 MIN PRN
Status: DISCONTINUED | OUTPATIENT
Start: 2024-10-28 | End: 2024-10-29

## 2024-10-28 RX ORDER — MORPHINE SULFATE 4 MG/ML
2 INJECTION, SOLUTION INTRAMUSCULAR; INTRAVENOUS EVERY 10 MIN PRN
Status: DISCONTINUED | OUTPATIENT
Start: 2024-10-28 | End: 2024-10-29

## 2024-10-28 RX ORDER — ATROPINE SULFATE 10 MG/ML
1 SOLUTION/ DROPS OPHTHALMIC AS NEEDED
Status: DISCONTINUED | OUTPATIENT
Start: 2024-10-28 | End: 2024-10-28 | Stop reason: HOSPADM

## 2024-10-28 RX ORDER — ONDANSETRON 2 MG/ML
4 INJECTION INTRAMUSCULAR; INTRAVENOUS EVERY 6 HOURS PRN
Status: DISCONTINUED | OUTPATIENT
Start: 2024-10-28 | End: 2024-10-29

## 2024-10-28 RX ORDER — GENTAMICIN SULFATE 3 MG/ML
2 SOLUTION/ DROPS OPHTHALMIC AS NEEDED
Status: DISCONTINUED | OUTPATIENT
Start: 2024-10-28 | End: 2024-10-28 | Stop reason: HOSPADM

## 2024-10-28 RX ORDER — HYDROMORPHONE HYDROCHLORIDE 1 MG/ML
0.4 INJECTION, SOLUTION INTRAMUSCULAR; INTRAVENOUS; SUBCUTANEOUS EVERY 5 MIN PRN
Status: DISCONTINUED | OUTPATIENT
Start: 2024-10-28 | End: 2024-10-29

## 2024-10-28 RX ORDER — LABETALOL HYDROCHLORIDE 5 MG/ML
10 INJECTION, SOLUTION INTRAVENOUS ONCE
Status: COMPLETED | OUTPATIENT
Start: 2024-10-28 | End: 2024-10-28

## 2024-10-28 RX ORDER — PROCHLORPERAZINE EDISYLATE 5 MG/ML
5 INJECTION INTRAMUSCULAR; INTRAVENOUS EVERY 8 HOURS PRN
Status: DISCONTINUED | OUTPATIENT
Start: 2024-10-28 | End: 2024-10-29

## 2024-10-28 RX ORDER — ATROPINE SULFATE 10 MG/ML
SOLUTION/ DROPS OPHTHALMIC AS NEEDED
Status: DISCONTINUED | OUTPATIENT
Start: 2024-10-28 | End: 2024-10-28 | Stop reason: HOSPADM

## 2024-10-28 RX ADMIN — SODIUM CHLORIDE, SODIUM LACTATE, POTASSIUM CHLORIDE, CALCIUM CHLORIDE: 600; 310; 30; 20 INJECTION, SOLUTION INTRAVENOUS at 17:38:00

## 2024-10-28 RX ADMIN — LIDOCAINE HYDROCHLORIDE 50 MG: 10 INJECTION, SOLUTION EPIDURAL; INFILTRATION; INTRACAUDAL; PERINEURAL at 15:51:00

## 2024-10-28 RX ADMIN — DEXAMETHASONE SODIUM PHOSPHATE 4 MG: 4 MG/ML VIAL (ML) INJECTION at 16:00:00

## 2024-10-28 RX ADMIN — MIDAZOLAM HYDROCHLORIDE 2 MG: 1 INJECTION INTRAMUSCULAR; INTRAVENOUS at 15:48:00

## 2024-10-28 RX ADMIN — ONDANSETRON 4 MG: 2 INJECTION INTRAMUSCULAR; INTRAVENOUS at 16:00:00

## 2024-10-28 RX ADMIN — LABETALOL HYDROCHLORIDE 5 MG: 5 INJECTION, SOLUTION INTRAVENOUS at 17:47:00

## 2024-10-28 RX ADMIN — SODIUM CHLORIDE, SODIUM LACTATE, POTASSIUM CHLORIDE, CALCIUM CHLORIDE: 600; 310; 30; 20 INJECTION, SOLUTION INTRAVENOUS at 15:48:00

## 2024-10-28 NOTE — BRIEF OP NOTE
Pre-Operative Diagnosis: Retinal detachment [H33.20]     Post-Operative Diagnosis: Retinal detachment [H33.20]      Procedure Performed:   SCLERAL BUCKLE, CRYORETINOPEXY, SF6 GAS INJECTION, LEFT EYE    Surgeons and Role:     * Tereso Kelsey MD - Primary    Assistant(s):   None     Surgical Findings: scattered lattice degeneration with atrophic holes, horseshoe tear at 10:30 just posterior to lattice degeneration with a retinal hole      Specimen: none     Estimated Blood Loss: Blood Output: 2 mL (10/28/2024  5:38 PM)      Dictation Number:  n/a    Tereso Kelsey MD  10/28/2024  5:51 PM

## 2024-10-28 NOTE — OPERATIVE REPORT
[facility]  part of [facility]    Operative Note         Lili Claire Location: OR   [facility] [id number] MRN [id number]   Admission Date 10/28/2024 Operation Date 10/28/2024   Attending Physician Tereso Kelsey MD       Patient Name: Lili Claire     Preoperative Diagnosis: Retinal detachment, left eye [H33.20]     Postoperative Diagnosis: Retinal detachment, left eye [H33.20]     Procedure(s):  SCLERAL BUCKLE, CRYORETINOPEXY, SF6 GAS INJECTION, LEFT EYE     Primary Surgeon: Tereso Kelsey MD     Assistant Surgeon: None     Anesthesia: General anesthesia with retrobulbar block     Specimen: None     Estimated Blood Loss: Approximately 2cc  Complications: none      Indications for procedure: Retinal detachment     Surgical Findings: scattered lattice degeneration with atrophic holes, horseshoe tear at 10:30 just posterior to lattice degeneration with a retinal hole     Operative Summary: Patient is a 39-year-old woman with history of LASIK both eyes presenting for treatment of retinal detachment.  Risks, benefits, and alternatives were discussed with patient.  Risks include cataract, pain, bleeding, infection, inflammation, glaucoma (elevated intraocular pressure), refractive changes, need for further surgeries, temporary and permanent vision loss up to and including blindness, and loss of eye.  Alternatives include vitrectomy or scleral buckle with vitrectomy. Risks of anesthesia which are not related to the surgery include stroke, heart attack, and death.  Patient expressed understanding of all risks and wished to proceed with surgery as recommended.     Patient was identified in the preop waiting room, and the left eye was marked.  Patient was brought to the operating room where anesthesia was initiated by the anesthesia personnel.  A retrobulbar block of 50% Marcaine and 50% lidocaine without epinephrine was delivered to the left orbit, and 5 cc of the block was delivered to  the orbit without complication.  The patient was prepped with Betadine and draped in the usual fashion for ophthalmic surgery.     The intraocular pressure was noted to be low to palpation. A 360 degree peritomy was performed.  Slaughter tenotomy scissors were used to free the globe from the Tenons capsule.  The rectus muscles were identified and isolated with silk sutures.  The sclera was examined in each quadrant and found to have no thinning.  Indirect ophthalmoscopy was used to examine the retina, identify the breaks, perform cryotherapy around each of the breaks, and elias the breaks.  The horseshoe tear at 10:30 was marked using scleral depression.  4-0 silk sutures were preplaced in each quadrant, 8 mm apart from each other, straddling the location of the break in the superonasal quadrant and straddling the locations of the lattice degeneration.  A 510 silicone sponge was brought to the field.  The silicone sponge was placed under each rectus muscle and through each of the preplaced sutures. The sutures were tied, causing imbrication of the sclera around the buckle. The excess length of the sponge was trimmed and tied together in the superotemporal quadrant. The intraocular pressure remained slightly low.  Indirect ophthalmoscopy was used to examine the buckle, and the breaks were noted to be on the buckle. There was some radial folds of the retina superiorly, and the break amonst lattice just anterior to the HST was slightly anterior to the buckle. Thus, 0.7cc of pure SF6 was injected through the superior pars plana in one contiguous bubble without complication. The intraocular pressure was slightly high, so an anterior chamber paracentesis was performed using a 30-gauge needle through the corneal limbus without complication.  No drainage was performed. The central retinal artery was noted to be fully perfused throughout the entire case.  The silk sutures were removed from the rectus muscles.  The conjunctiva  was closed using  running 6-0 plain gut sutures laterally and nasally from the limbus peripherally.  The final intraocular pressure was approximately 10 mmHg to palpation.     Atropine drops and TobraDex ointment were placed in the eye, and a patch and shield was placed over the eye. An arrow was placed over her patch to indicate her positioning - upright and tilted 30 degrees to the left. The patient was turned over to anesthesia in stable condition.  The patient was instructed to leave the patch in place, and to come to the clinic appointment tomorrow, to take Tylenol for mild pain, and to call the office immediately for severe pain.    Implants:   Implant Name Type Inv. Item Serial No.  Lot No. LRB No. Used Action   SPONGE SCLER [id number] HALF RND STYL 510 PHILL - SN/A  SPONGE SCLER [id number] HALF RND STYL 510 PHILL N/A [facility] [id number] Left 1 Implanted        Drains: none     Condition: stable       Tereso Kelsey MD

## 2024-10-28 NOTE — ANESTHESIA PROCEDURE NOTES
Airway  Date/Time: 10/28/2024 3:53 PM  Urgency: elective    Airway not difficult    General Information and Staff    Patient location during procedure: OR  Anesthesiologist: Derrick Seymour MD  Resident/CRNA: Cora Flower CRNA  Performed: CRNA   Performed by: Cora Flower CRNA  Authorized by: Derrick Seymour MD      Indications and Patient Condition  Indications for airway management: anesthesia  Spontaneous Ventilation: absent  Sedation level: deep  Preoxygenated: yes  Patient position: sniffing  MILS maintained throughout  Mask difficulty assessment: 0 - not attempted    Final Airway Details  Final airway type: supraglottic airway      Successful airway: flexible  Size 4       Number of attempts at approach: 1  Ventilation between attempts: none  Number of other approaches attempted: 0

## 2024-10-28 NOTE — DISCHARGE INSTRUCTIONS
Retina Post-Operative Care Instructions:  Leave patch in place until follow up appointment. Bring eye drops to follow up appointment  Positioning: upright and tilted 30 degrees to the left (so that arrow on patch is vertical).  Mild pain or irritation is normal. Take Tylenol as needed for mild pain.  Call the office immediately with severe pain at [phone] at any time of day or night.  No driving until cleared by your surgeon.  Because gas was used, no air travel, SCUBA diving, or travel to high elevation until the bubble completely resolves (approximately 1 month).     HOME INSTRUCTIONS  [facility]G HOME CARE INSTRUCTIONS: POST-OP ANESTHESIA  The medication that you received for sedation or general anesthesia can last up to 24 hours. Your judgment and reflexes may be altered, even if you feel like your normal self.      We Recommend:   Do not drive any motor vehicle or bicycle   Avoid mowing the lawn, playing sports, or working with power tools/applicances (power saws, electric knives or mixers)   That you have someone stay with you on your first night home   Do not drink alcohol or take sleeping pills or tranquilizers   Do not sign legal documents within 24 hours of your procedure   If you had a nerve block for your surgery, take extra care not to put any pressure on your arm or hand for 24 hours    It is normal:  For you to have a sore throat if you had a breathing tube during surgery (while you were asleep!). The sore throat should get better within 48 hours. You can gargle with warm salt water (1/2 tsp in 4 oz warm water) or use a throat lozenge for comfort  To feel muscle aches or soreness especially in the abdomen, chest or neck. The achy feeling should go away in the next 24 hours  To feel weak, sleepy or \"wiped out\". Your should start feeling better in the next 24 hours.   To experience mild discomforts such as sore lip or tongue, headache, cramps, gas pains or a bloated feeling in your abdomen.   To  experience mild back pain or soreness for a day or two if you had spinal or epidural anesthesia.   If you had laparoscopic surgery, to feel shoulder pain or discomfort on the day of surgery.   For some patients to have nausea after surgery/anesthesia    If you feel nausea or experience vomiting:   Try to move around less.   Eat less than usual or drink only liquids until the next morning   Nausea should resolve in about 24 hours    If you have a problem when you are at home:    Call your surgeons office           Discharge Instructions: After Your Surgery  You’ve just had surgery. During surgery, you were given medicine called anesthesia to keep you relaxed and free of pain. After surgery, you may have some pain or nausea. This is common. Here are some tips for feeling better and getting well after surgery.   Going home  Your healthcare provider will show you how to take care of yourself when you go home. They'll also answer your questions. Have an adult family member or friend drive you home. For the first 24 hours after your surgery:   Don't drive or use heavy equipment.  Don't make important decisions or sign legal papers.  Take medicines as directed.  Don't drink alcohol.  Have someone stay with you, if needed. They can watch for problems and help keep you safe.  Be sure to go to all follow-up visits with your healthcare provider. And rest after your surgery for as long as your provider tells you to.   Coping with pain  If you have pain after surgery, pain medicine will help you feel better. Take it as directed, before pain becomes severe. Also, ask your healthcare provider or pharmacist about other ways to control pain. This might be with heat, ice, or relaxation. And follow any other instructions your surgeon or nurse gives you.      Stay on schedule with your medicine.     Tips for taking pain medicine  To get the best relief possible, remember these points:   Pain medicines can upset your stomach. Taking  them with a little food may help.  Most pain relievers taken by mouth need at least 20 to 30 minutes to start to work.  Don't wait till your pain becomes severe before you take your medicine. Try to time your medicine so that you can take it before starting an activity. This might be before you get dressed, go for a walk, or sit down for dinner.  Constipation is a common side effect of some pain medicines. Call your healthcare provider before taking any medicines such as laxatives or stool softeners to help ease constipation. Also ask if you should skip any foods. Drinking lots of fluids and eating foods such as fruits and vegetables that are high in fiber can also help. Remember, don't take laxatives unless your surgeon has prescribed them.  Drinking alcohol and taking pain medicine can cause dizziness and slow your breathing. It can even be deadly. Don't drink alcohol while taking pain medicine.  Pain medicine can make you react more slowly to things. Don't drive or run machinery while taking pain medicine.  Your healthcare provider may tell you to take acetaminophen to help ease your pain. Ask them how much you're supposed to take each day. Acetaminophen or other pain relievers may interact with your prescription medicines or other over-the-counter (OTC) medicines. Some prescription medicines have acetaminophen and other ingredients in them. Using both prescription and OTC acetaminophen for pain can cause you to accidentally overdose. Read the labels on your OTC medicines with care. This will help you to clearly know the list of ingredients, how much to take, and any warnings. It may also help you not take too much acetaminophen. If you have questions or don't understand the information, ask your pharmacist or healthcare provider to explain it to you before you take the OTC medicine.   Managing nausea  Some people have an upset stomach (nausea) after surgery. This is often because of anesthesia, pain, or pain  medicine, less movement of food in the stomach, or the stress of surgery. These tips will help you handle nausea and eat healthy foods as you get better. If you were on a special food plan before surgery, ask your healthcare provider if you should follow it while you get better. Check with your provider on how your eating should progress. It may depend on the surgery you had. These general tips may help:   Don't push yourself to eat. Your body will tell you when to eat and how much.  Start off with clear liquids and soup. They're easier to digest.  Next try semi-solid foods as you feel ready. These include mashed potatoes, applesauce, and gelatin.  Slowly move to solid foods. Don’t eat fatty, rich, or spicy foods at first.  Don't force yourself to have 3 large meals a day. Instead eat smaller amounts more often.  Take pain medicines with a small amount of solid food, such as crackers or toast. This helps prevent nausea.  When to call your healthcare provider  Call your healthcare provider right away if you have any of these:   You still have too much pain, or the pain gets worse, after taking the medicine. The medicine may not be strong enough. Or there may be a complication from the surgery.  You feel too sleepy, dizzy, or groggy. The medicine may be too strong.  Side effects such as nausea or vomiting. Your healthcare provider may advise taking other medicines to .  Skin changes such as rash, itching, or hives. This may mean you have an allergic reaction. Your provider may advise taking other medicines.  The incision looks different (for instance, part of it opens up).  Bleeding or fluid leaking from the incision site, and weren't told to expect that.  Fever of 100.4°F (38°C) or higher, or as directed by your provider.  Call 911  Call 911 right away if you have:   Trouble breathing  Facial swelling    If you have obstructive sleep apnea   You were given anesthesia medicine during surgery to keep you comfortable  and free of pain. After surgery, you may have more apnea spells because of this medicine and other medicines you were given. The spells may last longer than normal.    At home:  Keep using the continuous positive airway pressure (CPAP) device when you sleep. Unless your healthcare provider tells you not to, use it when you sleep, day or night. CPAP is a common device used to treat obstructive sleep apnea.  Talk with your provider before taking any pain medicine, muscle relaxants, or sedatives. Your provider will tell you about the possible dangers of taking these medicines.  Contact your provider if your sleeping changes a lot even when taking medicines as directed.  [employer] last reviewed this educational content on 10/1/2021  © 1714-9737 [employer]. All rights reserved. This information is not intended as a substitute for professional medical care. Always follow your healthcare professional's instructions.

## 2024-10-28 NOTE — ANESTHESIA PREPROCEDURE EVALUATION
Anesthesia PreOp Note    HPI:     Lili Claire is a 39 year old female who presents for preoperative consultation requested by: Tereso Kelsey MD    Date of Surgery: 10/28/2024    Procedure(s):  PARS PLANA VITRECTOMY WITH CRYORETINOPEXY OF LEFT EYE, POSSIBLE SCLERAL BUCKLE, LEFT EYE  Indication: Retinal detachment [H33.20]    Relevant Problems   No relevant active problems       NPO:  Last Liquid Consumption Date: 10/27/24  Last Liquid Consumption Time: 1900  Last Solid Consumption Date: 10/27/24  Last Solid Consumption Time: 1900  Last Liquid Consumption Date: 10/27/24          History Review:  Patient Active Problem List    Diagnosis Date Noted    History of sarcoma of bone 07/07/2022    Encounter for supervision of normal first pregnancy in second trimester (HCC) 03/02/2019    Other idiopathic scoliosis, lumbar region 03/02/2019    History of blood transfusion 01/15/2019    Borderline high blood pressure 12/26/2018    Personal history of osteosarcoma 11/14/2018    Hypothyroidism 12/12/2017    Leg pain 02/16/2015       Past Medical History:    Allergic rhinitis    Anemia    related to cancer treatments 4027-2043    Cancer (HCC)    Osteosarcoma - left tibia    Essential hypertension    during pregnancy, induced labor     History of blood transfusion    when pt was 13 and 17 for back and leg surgeries, no reaction    Hypertension    Hypothyroidism    hypothyroid dx'd 2016    Migraines    Hormonal    Personal history of antineoplastic chemotherapy    Pregnancy-induced hypertension (HCC)    Scoliosis    Scoliosis, adolescent, acquired    Seizure disorder (HCC)    Only had one episode, Studies done with no etiology or dx    Transfusion history    4 during cancer treatment & scoliosis surgery    Visual impairment       Past Surgical History:   Procedure Laterality Date    Back surgery      scoliosis rods, lower back    Bone replacement graft Left     Left tibia    Thyroid lobectomy,unilat Right 09/26/2016     [place] teeth removed         Prescriptions Prior to Admission[1]  Current Medications and Prescriptions Ordered in Epic[2]    Allergies[3]    Family History   Problem Relation Age of Onset    Other (Other) Mother         MS    Diabetes Maternal Grandmother     Heart Disease Maternal Grandfather     Cancer Paternal Grandmother     Heart Disease Maternal Uncle      Social History     Socioeconomic History    Marital status:      Spouse name: Luis Hamilton    Number of children: 0    Years of education: 17    Highest education level: Bachelor's degree (e.g., BA, AB, BS)   Occupational History    Occupation: food safety   Tobacco Use    Smoking status: Never    Smokeless tobacco: Never   Vaping Use    Vaping status: Never Used   Substance and Sexual Activity    Alcohol use: Yes     Comment: social    Drug use: No    Sexual activity: Yes     Partners: Male   Other Topics Concern     Service No    Caffeine Concern Yes     Comment: 1 cup daily    Hobby Hazards No    Stress Concern No    Special Diet No    Exercise No    Seat Belt Yes       Available pre-op labs reviewed.  Lab Results   Component Value Date    URINEPREG Negative 10/28/2024             Vital Signs:  Body mass index is 20.34 kg/m².   height is 1.676 m (5' 6\") and weight is 57.2 kg (126 lb). Her oral temperature is 97.7 °F (36.5 °C). Her blood pressure is 153/97 (abnormal) and her pulse is 75. Her respiration is 18.   Vitals:    10/28/24 0826 10/28/24 1322   BP:  (!) 153/97   Pulse:  75   Resp:  18   Temp:  97.7 °F (36.5 °C)   TempSrc:  Oral   Weight: 58.1 kg (128 lb) 57.2 kg (126 lb)   Height: 1.676 m (5' 6\") 1.676 m (5' 6\")        Anesthesia Evaluation     Patient summary reviewed and Nursing notes reviewed    No history of anesthetic complications   Airway   Mallampati: II  TM distance: >3 FB  Neck ROM: full  Dental - Dentition appears grossly intact     Pulmonary - negative ROS and normal exam   Cardiovascular - normal exam  Exercise  tolerance: good  (+) hypertension (during pregnancy) well controlled    Neuro/Psych    (+)  seizures (1 episode) well controlled, headaches,        GI/Hepatic/Renal - negative ROS     Endo/Other    (+) hypothyroidism    Comments: H/o osteosarcoma left tibia  Abdominal  - normal exam                 Anesthesia Plan:   ASA:  2  Plan:   General  Airway:  LMA  Post-op Pain Management: IV analgesics and Oral pain medication  Informed Consent Plan and Risks Discussed With:  Patient  Discussed plan with:  CRNA      I have informed Lili Claire of the nature of the anesthetic plan, benefits, risks including possible dental damage if relevant, major complications, and any alternative forms of anesthetic management.   All of the patient's questions were answered to the best of my ability. The patient desires the anesthetic management as planned.  PAULETTE SHORE MD  10/28/2024 2:39 PM  Present on Admission:  **None**           [1]   No medications prior to admission.   [2]   Current Facility-Administered Medications Ordered in Epic   Medication Dose Route Frequency Provider Last Rate Last Admin    lactated ringers infusion   Intravenous Continuous Tereso Kelsey MD 20 mL/hr at 10/28/24 1359 New Bag at 10/28/24 1359    atropine (Atropine-Care) 1 % ophthalmic solution 1 drop  1 drop Left Eye PRN Tereso Kelsey MD   1 drop at 10/28/24 1350    phenylephrine (Mydfrin) 2.5 % ophthalmic solution 2 drop  2 drop Left Eye PRN Tereso Kelsey MD   2 drop at 10/28/24 1358    gentamicin (Garamycin) 0.3 % ophthalmic solution 2 drop  2 drop Left Eye PRN Tereso Kelsey MD   2 drop at 10/28/24 1358     No current [facility]-ordered outpatient medications on file.   [3]   Allergies  Allergen Reactions    Latex RASH

## 2024-10-28 NOTE — H&P
[facility]  part of [facility]    History and Physical    Lili Jayme Claire Patient Status:  Hospital Outpatient Surgery    10/20/1985 MRN [id number]   Location [facility] PRE OP RECOVERY Attending Tereso Kelsey MD   Hosp Day # 0 PCP Sudeep Olson,      Date:  10/28/2024  Date of Admission:  10/28/2024    History provided by:patient  HPI:   Vision loss, left eye    39 year old woman with myopia s/p LASIK both eyes presenting for shadow in the vision of her left eye for 2-3 days, found to have a macula-involving retinal detachment of the left eye. Presenting for surgical management. No changes in health recently.        History     Past Medical History:    Allergic rhinitis    Anemia    related to cancer treatments [id number]    Cancer (HCC)    Osteosarcoma - left tibia    Essential hypertension    during pregnancy, induced labor     History of blood transfusion    when pt was 13 and 17 for back and leg surgeries, no reaction    Hypertension    Hypothyroidism    hypothyroid dx'd 2016    Migraines    Hormonal    Personal history of antineoplastic chemotherapy    Pregnancy-induced hypertension (HCC)    Scoliosis    Scoliosis, adolescent, acquired    Seizure disorder (HCC)    Only had one episode, Studies done with no etiology or dx    Transfusion history    4 during cancer treatment & scoliosis surgery    Visual impairment     Past Surgical History:   Procedure Laterality Date    Back surgery      scoliosis rods, lower back    Bone replacement graft Left     Left tibia    Thyroid lobectomy,unilat Right 2016    [place] teeth removed       Family History   Problem Relation Age of Onset    Other (Other) Mother         MS    Diabetes Maternal Grandmother     Heart Disease Maternal Grandfather     Cancer Paternal Grandmother     Heart Disease Maternal Uncle      Social History:  Social History     Socioeconomic History    Marital status:      Spouse name: Luis Hamilton     Number of children: 0    Years of education: 17    Highest education level: Bachelor's degree (e.g., BA, AB, BS)   Occupational History    Occupation: food safety   Tobacco Use    Smoking status: Never    Smokeless tobacco: Never   Vaping Use    Vaping status: Never Used   Substance and Sexual Activity    Alcohol use: Yes     Comment: social    Drug use: No    Sexual activity: Yes     Partners: Male   Other Topics Concern     Service No    Caffeine Concern Yes     Comment: 1 cup daily    Hobby Hazards No    Stress Concern No    Special Diet No    Exercise No    Seat Belt Yes     Allergies/Medications:   Allergies: Allergies[1]  No medications prior to admission.       Review of Systems:     Constitutional:  Negative for activity change, appetite change, chills, diaphoresis and fatigue.   HENT:  Negative for congestion, sinus pressure and sore throat.    Eyes:  Positive for visual disturbance. Negative for photophobia, pain, discharge, redness and itching.   Respiratory:  Negative for cough, chest tightness, shortness of breath and wheezing.    Cardiovascular:  Negative for chest pain, palpitations and leg swelling.   Gastrointestinal:  Negative for nausea, vomiting, abdominal pain, diarrhea and constipation.   Endocrine: Negative.    Genitourinary: Negative.    Musculoskeletal:  Negative for back pain, joint pain, neck pain and neck stiffness.   Skin:  Negative for rash and wound.   Allergic/Immunologic: Negative.    Neurological:  Negative for dizziness, weakness and headaches.   Hematological: Negative.    Psychiatric/Behavioral:  Negative for confusion, self-injury and agitation.        Physical Exam:   Vital Signs:  Blood pressure (!) 153/97, pulse 75, temperature 97.7 °F (36.5 °C), temperature source Oral, resp. rate 18, height 5' 6\" (1.676 m), weight 126 lb (57.2 kg), last menstrual period 10/17/2024, not currently breastfeeding.  Physical Exam  Constitutional:       General: She is not in acute  distress.     Appearance: Normal appearance.   HENT:      Head: Normocephalic and atraumatic.      Right Ear: External ear normal.      Left Ear: External ear normal.      Nose: Nose normal.      Mouth/Throat:      Mouth: Mucous membranes are moist.      Pharynx: Oropharynx is clear.   Eyes:      Extraocular Movements: Extraocular movements intact.      Pupils: Pupils are equal, round, and reactive to light.      Comments: Retinal detachment, left eye   Cardiovascular:      Rate and Rhythm: Normal rate and regular rhythm.      Pulses: Normal pulses.   Pulmonary:      Effort: Pulmonary effort is normal. No respiratory distress.      Breath sounds: Normal breath sounds.   Abdominal:      General: Abdomen is flat.      Palpations: Abdomen is soft.   Musculoskeletal:         General: No swelling, tenderness, deformity or signs of injury. Normal range of motion.      Cervical back: Normal range of motion and neck supple.   Skin:     General: Skin is warm and dry.      Capillary Refill: Capillary refill takes less than 2 seconds.      Findings: No lesion or rash.   Neurological:      General: No focal deficit present.      Mental Status: She is alert and oriented to person, place, and time.      Cranial Nerves: No cranial nerve deficit.   Psychiatric:         Mood and Affect: Mood normal.         Behavior: Behavior normal.         Thought Content: Thought content normal.         Judgment: Judgment normal.         Results:     Lab Results   Component Value Date    WBC 4.9 07/07/2022    HGB 13.5 07/07/2022    HCT 41.8 07/07/2022    .0 07/07/2022    CREATSERUM 0.82 07/07/2022    BUN 12 07/07/2022     07/07/2022    K 3.4 (L) 07/07/2022     07/07/2022    CO2 29.0 07/07/2022    GLU 81 07/07/2022    CA 8.8 07/07/2022    ALB 4.0 07/07/2022    ALKPHO 54 07/07/2022    BILT 1.1 07/07/2022    TP 7.3 07/07/2022    AST 17 07/07/2022    ALT 18 07/07/2022    T4F 0.8 07/07/2022    TSH 5.940 (H) 07/07/2022    MG 6.4 (HH)  05/07/2019         Assessment/Plan:   Retinal detachment, left eye    Plan for scleral buckle, cryoretinopexy, possible gas bubble, left eye for surgical repair.    Risks, benefits, and alternatives were discussed with patient. Risks include cataract, pain, bleeding, infection, inflammation, glaucoma (elevated intraocular pressure), optic neuropathy, refractive changes, need for further surgeries, need for postoperative positioning, temporary and permanent vision loss up to and including blindness, and loss of eye.  Alternative includes vitrectomy with scleral buckle and vitrectomy alone.  Risks of anesthesia which are not related to the surgery include stroke, heart attack, and death.  Patient expressed understanding of all risks and wished to proceed with surgery as recommended.              Tereso Kelsey MD  10/28/2024         [1]   Allergies  Allergen Reactions    Latex RASH

## 2024-10-28 NOTE — TELEPHONE ENCOUNTER
Dr. Choudhury,     Patient is scheduled with Dr. Kelsey today for a procedure.      Waiting on surgical notes for procedure.     This referral is for an OV.     Pended referral please review diagnosis and sign off if you agree.    Thank you.  Ilana Bernal  [facility]

## 2024-10-28 NOTE — ANESTHESIA POSTPROCEDURE EVALUATION
Patient: Lili Claire    Procedure Summary       Date: 10/28/24 Room / Location: [facility] MAIN OR  / [facility] MAIN OR    Anesthesia Start: 1548 Anesthesia Stop: 1756    Procedure: SCLERAL BUCKLE, CRYORETINOPEXY, SF6 GAS INJECTION, LEFT EYE (Left: Eye) Diagnosis:       Retinal detachment      (Retinal detachment [H33.20])    Surgeons: Tereso Kelesy MD Anesthesiologist: Paulette Fernandez MD    Anesthesia Type: general ASA Status: 2            Anesthesia Type: general    Vitals Value Taken Time   /107 10/28/24 1755   Temp 98.7 °F (37.1 °C) 10/28/24 1753   Pulse 68 10/28/24 1756   Resp 12 10/28/24 1756   SpO2 100 % 10/28/24 1756   Vitals shown include unfiled device data.    [facility] AN Post Evaluation:   Patient Evaluated in PACU  Patient Participation: complete - patient participated  Level of Consciousness: awake and alert  Pain Score: 0  Pain Management: adequate  Airway Patency:patent  Yes    Nausea/Vomiting: none  Cardiovascular Status: acceptable  Respiratory Status: acceptable  Postoperative Hydration acceptable      PAULETTE FERNANDEZ MD  10/28/2024 5:56 PM

## 2024-10-29 ENCOUNTER — TELEPHONE (OUTPATIENT)
Dept: FAMILY MEDICINE CLINIC | Facility: CLINIC | Age: 39
End: 2024-10-29

## 2024-10-29 NOTE — PROGRESS NOTES
I re-examined patient due to eye pain, nausea, vomiting, and headache. Patient had received Diamox 500mg, Norco 10/325. Patient appeared uncomfortable but positioning correctly.  at bedside. Removed patch. Grossly, eye appeared normal for postoperative status. IOP was 10 as measured with tonopen, and digital palpation was consistent with that pressure. Retina appeared fully attached, and the central retinal artery was perfused. Erythromycin ointment was applied and the patch and shield were replaced. Patient can be discharged from my perspective as vital signs are stable. Can use ibuprofen and Tylenol as needed for pain (but adhere to dose limitations on packaging).

## 2024-11-04 ENCOUNTER — MED REC SCAN ONLY (OUTPATIENT)
Dept: FAMILY MEDICINE CLINIC | Facility: CLINIC | Age: 39
End: 2024-11-04

## 2024-12-03 ENCOUNTER — LAB ENCOUNTER (OUTPATIENT)
Dept: LAB | Facility: HOSPITAL | Age: 39
End: 2024-12-03
Attending: FAMILY MEDICINE
Payer: COMMERCIAL

## 2024-12-03 DIAGNOSIS — Z00.00 ROUTINE GENERAL MEDICAL EXAMINATION AT A HEALTH CARE FACILITY: ICD-10-CM

## 2024-12-03 DIAGNOSIS — E06.3 HYPOTHYROIDISM DUE TO HASHIMOTO'S THYROIDITIS: ICD-10-CM

## 2024-12-03 LAB
ALBUMIN SERPL-MCNC: 4.7 G/DL (ref 3.2–4.8)
ALBUMIN/GLOB SERPL: 1.7 {RATIO} (ref 1–2)
ALP LIVER SERPL-CCNC: 57 U/L
ALT SERPL-CCNC: 8 U/L
ANION GAP SERPL CALC-SCNC: 4 MMOL/L (ref 0–18)
AST SERPL-CCNC: 13 U/L (ref ?–34)
BASOPHILS # BLD AUTO: 0.04 X10(3) UL (ref 0–0.2)
BASOPHILS NFR BLD AUTO: 0.9 %
BILIRUB SERPL-MCNC: 1.5 MG/DL (ref 0.3–1.2)
BUN BLD-MCNC: 15 MG/DL (ref 9–23)
BUN/CREAT SERPL: 16 (ref 10–20)
CALCIUM BLD-MCNC: 9.3 MG/DL (ref 8.7–10.4)
CHLORIDE SERPL-SCNC: 109 MMOL/L (ref 98–112)
CHOLEST SERPL-MCNC: 209 MG/DL (ref ?–200)
CO2 SERPL-SCNC: 28 MMOL/L (ref 21–32)
CREAT BLD-MCNC: 0.94 MG/DL
DEPRECATED RDW RBC AUTO: 43.5 FL (ref 35.1–46.3)
EGFRCR SERPLBLD CKD-EPI 2021: 78 ML/MIN/1.73M2 (ref 60–?)
EOSINOPHIL # BLD AUTO: 0.05 X10(3) UL (ref 0–0.7)
EOSINOPHIL NFR BLD AUTO: 1.1 %
ERYTHROCYTE [DISTWIDTH] IN BLOOD BY AUTOMATED COUNT: 12.6 % (ref 11–15)
FASTING PATIENT LIPID ANSWER: YES
FASTING STATUS PATIENT QL REPORTED: YES
GLOBULIN PLAS-MCNC: 2.7 G/DL (ref 2–3.5)
GLUCOSE BLD-MCNC: 86 MG/DL (ref 70–99)
HCT VFR BLD AUTO: 42.6 %
HDLC SERPL-MCNC: 66 MG/DL (ref 40–59)
HGB BLD-MCNC: 14.3 G/DL
IMM GRANULOCYTES # BLD AUTO: 0.01 X10(3) UL (ref 0–1)
IMM GRANULOCYTES NFR BLD: 0.2 %
LDLC SERPL CALC-MCNC: 132 MG/DL (ref ?–100)
LYMPHOCYTES # BLD AUTO: 1.62 X10(3) UL (ref 1–4)
LYMPHOCYTES NFR BLD AUTO: 35.9 %
MCH RBC QN AUTO: 31.4 PG (ref 26–34)
MCHC RBC AUTO-ENTMCNC: 33.6 G/DL (ref 31–37)
MCV RBC AUTO: 93.4 FL
MONOCYTES # BLD AUTO: 0.37 X10(3) UL (ref 0.1–1)
MONOCYTES NFR BLD AUTO: 8.2 %
NEUTROPHILS # BLD AUTO: 2.42 X10 (3) UL (ref 1.5–7.7)
NEUTROPHILS # BLD AUTO: 2.42 X10(3) UL (ref 1.5–7.7)
NEUTROPHILS NFR BLD AUTO: 53.7 %
NONHDLC SERPL-MCNC: 143 MG/DL (ref ?–130)
OSMOLALITY SERPL CALC.SUM OF ELEC: 292 MOSM/KG (ref 275–295)
PLATELET # BLD AUTO: 252 10(3)UL (ref 150–450)
POTASSIUM SERPL-SCNC: 3.7 MMOL/L (ref 3.5–5.1)
PROT SERPL-MCNC: 7.4 G/DL (ref 5.7–8.2)
RBC # BLD AUTO: 4.56 X10(6)UL
SODIUM SERPL-SCNC: 141 MMOL/L (ref 136–145)
T4 FREE SERPL-MCNC: 0.9 NG/DL (ref 0.8–1.7)
TRIGL SERPL-MCNC: 63 MG/DL (ref 30–149)
TSI SER-ACNC: 4.18 UIU/ML (ref 0.55–4.78)
VLDLC SERPL CALC-MCNC: 11 MG/DL (ref 0–30)
WBC # BLD AUTO: 4.5 X10(3) UL (ref 4–11)

## 2024-12-03 PROCEDURE — 80053 COMPREHEN METABOLIC PANEL: CPT

## 2024-12-03 PROCEDURE — 84439 ASSAY OF FREE THYROXINE: CPT

## 2024-12-03 PROCEDURE — 80061 LIPID PANEL: CPT

## 2024-12-03 PROCEDURE — 36415 COLL VENOUS BLD VENIPUNCTURE: CPT

## 2024-12-03 PROCEDURE — 84443 ASSAY THYROID STIM HORMONE: CPT

## 2024-12-03 PROCEDURE — 85025 COMPLETE CBC W/AUTO DIFF WBC: CPT

## 2024-12-13 DIAGNOSIS — E06.3 HYPOTHYROIDISM DUE TO HASHIMOTO'S THYROIDITIS: Primary | ICD-10-CM

## 2024-12-13 RX ORDER — LEVOTHYROXINE SODIUM 75 UG/1
75 TABLET ORAL
Qty: 30 TABLET | Refills: 1 | Status: SHIPPED | OUTPATIENT
Start: 2024-12-13

## 2024-12-18 RX ORDER — LEVOTHYROXINE SODIUM 75 UG/1
75 TABLET ORAL
Qty: 90 TABLET | Refills: 0 | OUTPATIENT
Start: 2024-12-18

## 2024-12-18 NOTE — TELEPHONE ENCOUNTER
levothyroxine 75 MCG Oral Tab 30 tablet 1 12/13/2024 --    Sig - Route: Take 1 tablet (75 mcg total) by mouth before breakfast. - Oral    Sent to pharmacy as: Levothyroxine Sodium 75 MCG Oral Tablet (Synthroid)    E-Prescribing Status: Receipt confirmed by pharmacy (12/13/2024  5:13 PM CST)      Pharmacy    [id number] - [address] [place], [phone], [phone]

## (undated) DEVICE — APPLICATOR,COTTON-TIP,WOOD,3,STRL: Brand: MEDLINE

## (undated) DEVICE — SOLUTION PREP 30ML 5% POVIDONE IOD EYE BDINE

## (undated) DEVICE — 25+®GA HYPERVIT®  BEVEL 20K CPM TOTAL PLUS® VITRECTOMY PAK: Brand: CONSTELLATION® HYPERVIT® TOTAL PLUS®

## (undated) DEVICE — EYE PAK* 1030 NON-WOVEN OPHTHALMIC DRAPE INCISE POUCHES: Brand: ALCON EYE-PAK

## (undated) DEVICE — RETINAL: Brand: MEDLINE INDUSTRIES, INC.

## (undated) DEVICE — CATHETER URETH 14FR L16IN RED RUB RND HLLW

## (undated) DEVICE — ENCORE® LATEX MICRO SIZE 6.5, STERILE LATEX POWDER-FREE SURGICAL GLOVE: Brand: ENCORE

## (undated) DEVICE — 12 ML SYRINGE LUER-LOCK TIP: Brand: MONOJECT

## (undated) DEVICE — SOLUTION IRRIG 500ML BAL SALT 2 CHMBR GLS BSS

## (undated) DEVICE — SUT PLN GUT 6-0 18IN TG140-8 ABSRB TAN YELLOW

## (undated) DEVICE — WAX COATED BRAIDED SILK: Brand: SOFSILK

## (undated) DEVICE — 3 ML SYRINGE LUER-LOCK TIP: Brand: MONOJECT

## (undated) DEVICE — PACK SRG BIOM FULL DRP STRL

## (undated) DEVICE — SOLUTION IRRIG 1000ML ST H2O AQUALITE PLAS

## (undated) DEVICE — 3M™ MICROFOAM™ SURGICAL TAPE, 2 INCHES X 5 YARDS, 6 ROLLS/CARTON, 6 CARTONS/CASE, 1528-2: Brand: 3M™ MICROFOAM™

## (undated) DEVICE — ST. PLASTIC BAGS: Brand: DEROYAL

## (undated) DEVICE — FILTER FLUID EYE E4429-22

## (undated) DEVICE — SOLUTION IRRIG 250ML 0.9% NACL

## (undated) DEVICE — SYRINGE TB 29G

## (undated) NOTE — MR AVS SNAPSHOT
[address]  [phone]               Thank you for choosing us for your health care visit with Ruchi Wilson MD.  We are glad to serve you and happy to provide you with this summary of your visit. Visits < Visit Summaries. MyChart questions? Call [phone] for help. [employer] is NOT to be used for urgent needs. For medical emergencies, dial 911.            Visit [facility] online at  [email or web address]

## (undated) NOTE — LETTER
3/13/2019              [address]        [facility] [address]         Dear Mario Alberto Dykes records indicate that the blood tests ordered for you by Skyler Reid MD  have not been done.   If you have, in fact, already comp

## (undated) NOTE — LETTER
VACCINE ADMINISTRATION RECORD  PARENT / GUARDIAN APPROVAL  Date: 2019  Vaccine administered to: Erika Jorgensen     : 10/20/1985    MRN: [id number]    A copy of the appropriate Centers for Disease Control and Prevention Vaccine Information statem

## (undated) NOTE — LETTER
8/16/2019              [address]        [facility] [address]         Dear Ken Lopez records indicate that the tests ordered for you by Tiana Gilman MD  have not been done.   If you have, in fact, already completed

## (undated) NOTE — Clinical Note
Am sending you this jackson AMA primip.   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

## (undated) NOTE — Clinical Note
Level II ultrasound at ~ 20 weeksFollow-up Growth ultrasound at 32 weeks. Serial growth US starting at 28 weeks if HTN is persistent. Weekly NST's at 36 weeks.   Earlier testing if gestational HTN developsMonitor TSH every 8-12 weeks or more frequently if h

## (undated) NOTE — LETTER
12/08/18        [facility]      Dear Rowdy Everett,    Our records indicate that you have outstanding lab work and or testing that was ordered for you and has not yet been completed:  Orders Placed This Encounter

## (undated) NOTE — LETTER
12/7/2020              [address]        [place] [address]         Dear Samantha Collazo records indicate that the tests ordered for you by Bassam Ponce, DO  have not been done.   If you have, in fact, already completed

## (undated) NOTE — ED AVS SNAPSHOT
Kenneth Cohen   MRN: [id number]    Department:  [facility] Emergency Department   Date of Visit:  4/14/2019           Disclosure     Insurance plans vary and the physician(s) referred by the ER may not be covered by your plan.  Please contac CARE PHYSICIAN AT ONCE OR RETURN IMMEDIATELY TO THE EMERGENCY DEPARTMENT. If you have been prescribed any medication(s), please fill your prescription right away and begin taking the medication(s) as directed.   If you believe that any of the medications

## (undated) NOTE — MR AVS SNAPSHOT
[place]  [address]  [phone]               Thank you for choosing us for your health care visit with Yury Albert MD.  We are glad to serve you and happy to provide you with this summary view more details from this visit by going to [email or web address]. If you've recently had a stay at the Hospital you can access your discharge instructions in [employer] by going to Visits < Admission Summaries.  If you've been to the Emergency Depar

## (undated) NOTE — LETTER
[facility]  [id number] [facility], [address] [place] [address]    Consent for Operation    Date: __________________    Time: _______________    1.  I authorize the performance upon Sera Adjutant the following operation:                              Primary videotape. [facility] will not be responsible for storage or maintenance of this tape. 6. For the purpose of advancing medical education, I consent to the admittance of observers to the Operating Room.     7. I authorize the use of any specimen, organs Signature of Patient:   ___________________________    When the patient is a minor or mentally incompetent to give consent:  Signature of person authorized to consent for patient: ___________________________   Relationship to patient: _____________________ 3. I understand how the anesthesia medicine will help me (benefits). 4. I understand that with any type of anesthesia medicine there are risks:  a.  The most common risks are: nausea, vomiting, sore throat, muscle soreness, damage to my eyes, mouth, or t _____________________________________________________________________________  Witness        Date   Time  I have verified that the signature is that of the patient or patient’s representative, and that it was signed before the procedure    Page 2 of 2